# Patient Record
Sex: FEMALE | Race: WHITE
[De-identification: names, ages, dates, MRNs, and addresses within clinical notes are randomized per-mention and may not be internally consistent; named-entity substitution may affect disease eponyms.]

---

## 2017-03-23 ENCOUNTER — HOSPITAL ENCOUNTER (OUTPATIENT)
Dept: HOSPITAL 75 - LAB | Age: 52
End: 2017-03-23
Attending: OTOLARYNGOLOGY
Payer: COMMERCIAL

## 2017-03-23 DIAGNOSIS — E03.8: ICD-10-CM

## 2017-03-23 DIAGNOSIS — E04.2: Primary | ICD-10-CM

## 2017-03-23 LAB
CA-I BLD-SCNC: 1.16 MMOL/L (ref 1.16–1.32)
CORRECTED IONIZED CALCIUM: 1.16 MMOL/L (ref 1.16–1.32)

## 2017-03-23 PROCEDURE — 82330 ASSAY OF CALCIUM: CPT

## 2017-03-23 PROCEDURE — 36415 COLL VENOUS BLD VENIPUNCTURE: CPT

## 2017-03-24 LAB — CALCIUM PH: 7.39

## 2017-07-10 ENCOUNTER — HOSPITAL ENCOUNTER (OUTPATIENT)
Dept: HOSPITAL 75 - RAD | Age: 52
End: 2017-07-10
Attending: NURSE PRACTITIONER
Payer: COMMERCIAL

## 2017-07-10 PROCEDURE — 72100 X-RAY EXAM L-S SPINE 2/3 VWS: CPT

## 2017-07-10 PROCEDURE — 72072 X-RAY EXAM THORAC SPINE 3VWS: CPT

## 2017-07-10 NOTE — DIAGNOSTIC IMAGING REPORT
Three views of the thoracic spine.



INDICATION: Back pain.



FINDINGS:



The alignment of the thoracic spine is satisfactory. The

vertebral body heights are preserved. Disc heights are also

preserved. There is multilevel mild anterior osteophyte formation

seen. No posterior osteophytes are evident. The paravertebral

soft tissues appear unremarkable.



IMPRESSION: Minimal degenerative changes.



Dictated by: 



  Dictated on workstation # [id number]

## 2017-07-17 ENCOUNTER — HOSPITAL ENCOUNTER (OUTPATIENT)
Dept: HOSPITAL 75 - RAD | Age: 52
End: 2017-07-17
Attending: NURSE PRACTITIONER
Payer: COMMERCIAL

## 2017-07-17 DIAGNOSIS — M51.36: Primary | ICD-10-CM

## 2017-07-17 PROCEDURE — 72148 MRI LUMBAR SPINE W/O DYE: CPT

## 2017-07-17 NOTE — DIAGNOSTIC IMAGING REPORT
PROCEDURE: MRI lumbar spine.



TECHNIQUE: Multiplanar, multisequence MRI of the lumbar spine was

performed without contrast.



INDICATION: Back pain.



There are no previous MRI examinations available for comparison.



FINDINGS: The plain film examination of the lumbar spine

performed on 7/10/17 failed to show any sign of an acute

abnormality. However, there was degenerative disc and bony

disease at L5-S1 and to a lesser degree at L4-L5.



On the parasagittal images of this exam, there is again evidence

of narrowing of the disc spaces at L5-S1 and L4-L5. The discs at

these 2 levels are desiccated as well. At the L5-S1 level, there

is a disc bulge eccentric to the left. The disc indents the left

ventral aspect of the thecal sac and narrows the AP diameter to

approximately 8.2 mm. There does not appear to be any significant

narrowing of the neuroforamen at this level.



At L4-L5, there is also a disc bulge slightly eccentric to the

left. The disc narrows the AP diameter of the thecal sac to

approximately 8.9 mm. There does not appear to be any significant

neuroforaminal narrowing at this level.



The remainder of the lumbar spine is unremarkable for spinal

stenosis or nerve root encroachment.



There is no abnormal signal arising from the cord or the

vertebral bodies to indicate an acute abnormality.



There is no sign of a paraspinal mass.



IMPRESSION:

1. There is degenerative disc and bony disease at L4-L5 and

L5-S1. There is mild central stenosis on the left at both these

levels. There is no significant narrowing of the neuroforamen.

2. The remainder of the lumbar spine is unremarkable for spinal

stenosis or nerve root encroachment.

3. There is no sign of an acute bony abnormality or of a cord

lesion.



Dictated by: 



  Dictated on workstation # [id number]

## 2017-07-28 ENCOUNTER — HOSPITAL ENCOUNTER (OUTPATIENT)
Dept: HOSPITAL 75 - RAD | Age: 52
End: 2017-07-28
Attending: OBSTETRICS & GYNECOLOGY
Payer: COMMERCIAL

## 2017-07-28 DIAGNOSIS — Z12.31: Primary | ICD-10-CM

## 2017-07-28 PROCEDURE — 77067 SCR MAMMO BI INCL CAD: CPT

## 2017-07-31 NOTE — DIAGNOSTIC IMAGING REPORT
EXAMINATION:

Bilateral screening mammogram 2D views with tomosynthesis. The

current study was also evaluated with a Computer Aided Detection

(CAD) system.



INDICATION: 

Screening.



PERSONAL HISTORY: 

No current complaints stated on the questionnaire.



COMPARISON: 

07/27/2016.



FINDINGS:

The breasts are composed of heterogeneously dense parenchyma

which may decrease mammographic sensitivity. Allowing for

technique and positional differences, no suspicious change is

seen.



IMPRESSION: 

Dense breasts with no definite change. 



ACR BI-RADS Category 2: Benign findings.

Result letter will be mailed to the patient.

Note: At least 10% of breast cancer is not imaged by mammography.



 



Dictated by: 



  Dictated on workstation # [id number]

## 2018-03-09 NOTE — HISTORY AND PHYSICAL
DATE OF SERVICE:  03/23/2018



ADDENDUM:  The patient was seen for H&P on 03/05/2018.  



HISTORY OF PRESENT ILLNESS:

The patient is a 53-year-old white female referred by Dr Mahoney for 
screening colonoscopy. She is deemed to be of higher than average risk as her 
mother was diagnosed with colon cancer around the age of 60.  The patient 
reports around 01/01, she did have the onset of diarrhea with small amount of 
bright red blood.  She had no

chills or fever, had some mild crampy abdominal pain.  It was only on one 
occasion and has not recurred since.  She currently feels well, reporting 
stable weight.  Denying abdominal pain, bowel habit change other than the above 
which lasted for several days.  She states that she has been feeling well.  No 
chest pain, shortness of breath or dyspnea on exertion.



MEDICATIONS ON ADMISSION:

Include Crestor 10 mg daily, estradiol 1 mg daily, Bystolic 5 mg daily, Caltrate

with D one daily, and L-thyroxine 100 mcg daily.



PAST SURGICAL HISTORY:

She had thyroidectomy for what sounds like multinodular goiter in 03/2017 and

has been on thyroid replacement since.  She had total abdominal hysterectomy in

12/2016 and appendectomy in 2010.



FAMILY HISTORY:

Most notable for mother diagnosed with colon cancer at the age of 60.



SOCIAL HISTORY:

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

history.



PHYSICAL EXAMINATION:

GENERAL:  Reveals a well-appearing white female in no acute distress.

VITAL SIGNS:  Blood pressure 124/94, pulse 66 and regular.

HEENT:  She is a Mallampati class II oropharyngeal configuration.  No evidence

for pharyngeal erythema is noted.

CHEST:  Clear to auscultation.

CARDIOVASCULAR:  Reveals a regular rate and rhythm without murmur, S3 or S4.

ABDOMEN:  Soft, supple without mass, organomegaly or tenderness.

EXTREMITIES:  Reveal no cyanosis, clubbing or edema.



ASSESSMENT:

The patient is set up for screening colonoscopy on 03/23/2018.  Prep

instructions with the Suprep kit were given and she is deemed to be of higher

than average risk as there is a first degree family history of colon cancer. 

Index case being her mother diagnosed around the age of 60.



I thank you for the referral of this pleasant lady.





Job ID: [id number]

DocumentID: [id number]

Dictated Date:  03/09/2018 11:37:20

Transcription Date: 03/09/2018 12:28:16

Dictated By: AMRIT ARELLANO MD

MTDD

## 2018-03-16 ENCOUNTER — HOSPITAL ENCOUNTER (OUTPATIENT)
Dept: HOSPITAL 75 - PREOP | Age: 53
End: 2018-03-16
Attending: INTERNAL MEDICINE
Payer: COMMERCIAL

## 2018-03-16 VITALS — HEIGHT: 67 IN | WEIGHT: 205 LBS | BODY MASS INDEX: 32.18 KG/M2

## 2018-03-16 DIAGNOSIS — Z80.0: ICD-10-CM

## 2018-03-16 DIAGNOSIS — Z01.818: Primary | ICD-10-CM

## 2018-03-16 DIAGNOSIS — Z12.11: ICD-10-CM

## 2018-03-23 ENCOUNTER — HOSPITAL ENCOUNTER (OUTPATIENT)
Dept: HOSPITAL 75 - ENDO | Age: 53
Discharge: HOME | End: 2018-03-23
Attending: INTERNAL MEDICINE
Payer: COMMERCIAL

## 2018-03-23 VITALS — DIASTOLIC BLOOD PRESSURE: 86 MMHG | SYSTOLIC BLOOD PRESSURE: 140 MMHG

## 2018-03-23 VITALS — WEIGHT: 205 LBS | BODY MASS INDEX: 32.18 KG/M2 | HEIGHT: 67 IN

## 2018-03-23 VITALS — DIASTOLIC BLOOD PRESSURE: 52 MMHG | SYSTOLIC BLOOD PRESSURE: 159 MMHG

## 2018-03-23 VITALS — DIASTOLIC BLOOD PRESSURE: 76 MMHG | SYSTOLIC BLOOD PRESSURE: 132 MMHG

## 2018-03-23 DIAGNOSIS — K62.1: ICD-10-CM

## 2018-03-23 DIAGNOSIS — Z12.11: Primary | ICD-10-CM

## 2018-03-23 DIAGNOSIS — Z79.899: ICD-10-CM

## 2018-03-23 DIAGNOSIS — Z80.0: ICD-10-CM

## 2018-03-23 RX ADMIN — MIDAZOLAM HYDROCHLORIDE PRN MG: 1 INJECTION, SOLUTION INTRAMUSCULAR; INTRAVENOUS at 08:06

## 2018-03-23 RX ADMIN — MIDAZOLAM HYDROCHLORIDE PRN MG: 1 INJECTION, SOLUTION INTRAMUSCULAR; INTRAVENOUS at 08:41

## 2018-03-23 RX ADMIN — FENTANYL CITRATE PRN MCG: 50 INJECTION, SOLUTION INTRAMUSCULAR; INTRAVENOUS at 08:05

## 2018-03-23 RX ADMIN — FENTANYL CITRATE PRN MCG: 50 INJECTION, SOLUTION INTRAMUSCULAR; INTRAVENOUS at 08:08

## 2018-03-23 RX ADMIN — FENTANYL CITRATE PRN MCG: 50 INJECTION, SOLUTION INTRAMUSCULAR; INTRAVENOUS at 08:37

## 2018-03-23 RX ADMIN — FENTANYL CITRATE PRN MCG: 50 INJECTION, SOLUTION INTRAMUSCULAR; INTRAVENOUS at 08:40

## 2018-03-23 RX ADMIN — MIDAZOLAM HYDROCHLORIDE PRN MG: 1 INJECTION, SOLUTION INTRAMUSCULAR; INTRAVENOUS at 08:11

## 2018-03-23 NOTE — OPERATIVE REPORT
DATE OF SERVICE:  03/23/2018



REFERRING PHYSICIAN:

Yosi Mahoney MD



INDICATION FOR THE PROCEDURE:

Screening colonoscopy, family history of colon cancer.



DESCRIPTION OF THE PROCEDURE:

The patient was placed in left lateral decubitus position.  Prior to undergoing

colonoscopy digital rectal evaluation was performed.  Anal sphincter tone was

normal and the perianal reflexes intact.  No abnormalities were noted on visual

inspection of anal canal or distal rectal vault.  The colonoscope was then

inserted into the rectum and under direct visualization advanced to the cecum. 

The cecum was identified by identification of the ileocecal valve and cecal

strap.  Photographic documentation was obtained.  Careful inspection was made as

the colonoscope withdrawn.  The patient tolerated the procedure well.



FINDINGS:

There was no evidence for internal or external hemorrhoids.  Present distal

rectum with a diminutive sessile 2 mm hyperplastic appearing polyp.  It was

biopsied, ablated and submitted for histopathology with no noted blood loss. 

The remainder of the rectum was unremarkable.  There is sigmoid and colonic

redundancy, but no evidence for diverticular disease or polyps.  The descending

colon, splenic flexure, transverse colon, hepatic flexure, ascending colon and

cecum were unremarkable.



ASSESSMENT:

Diminutive hyperplastic appearing polyp was present in the distal rectum.  It

was subsequently biopsied and ablated with hot forceps with no subsequent blood

loss.  No other significant abnormalities were noted on today's procedure. 

Considering family history we would advocate consideration for repeat screening

colonoscopy in 5 years.  I thank you for the referral of this pleasant lady.





Job ID: [id number]

DocumentID: [id number]

Dictated Date:  03/23/2018 10:21:55

Transcription Date: 03/23/2018 14:32:59

Dictated By: AMRIT ARELLANO MD

## 2018-03-23 NOTE — XMS REPORT
Clinical Summary

 Created on: 2018



Morales Yoonelle

External Reference #: [id number]

: 1965

Sex: Female



Demographics







 Address  [address] [facility] [place]  [address]

 

 Home Phone  [phone]

 

 Preferred Language  English

 

 Marital Status  Unknown

 

 Hindu Affiliation  CAT

 

 Race  Unknown

 

 Ethnic Group  Not  or 





Author







 Author  [facility]

 

 Organization  [facility]

 

 Address  Unknown

 

 Phone  Unavailable







Support







 Name  Relationship  Address  Phone

 

 JESUS YOON  Unknown  [phone]







Care Team Providers







 Care Team Member Name  Role  Phone

 

 Aneudy Padilla MD  PCP  [phone]

 

 Rosamaria Haywood MD  Unavailable  [phone]







Source Comments

Some departments are not documenting in the electronic medical record.  If you 
do not see the information that you expected, contact Release of Information in 
the Health Information Management department at [phone] for further 
assistance in locating additional records.[facility]



Allergies

Not on File



Current Medications







      



  Prescription   Sig.   Disp.   Refills   Start   End Date   Status



      Date  

 

      



  rosuvastatin (CRESTOR) 10   Take 10 mg by mouth       Active



  mg tablet   daily.     

 

      



  nebivolol (BYSTOLIC) 5 mg   Take 5 mg by mouth daily.       Active



  tablet      

 

      



  aspirin EC 81 mg tablet   Take 81 mg by mouth       Active



   daily.     

 

      



  cyanocobalamin (VITAMIN   Take 1,000 mcg by mouth       Active



  B-12) 1,000 mcg tablet   daily.     

 

      



  CALCIUM CARBONATE   Take  by mouth.       Active



  (CALTRATE 600 PO)      

 

      



  MULTIVIT WITH   Take 1 Tab by mouth       Active



  CALCIUM,IRON,MIN   daily.     



  (ONE-A-DAY WOMENS FORMULA      



  PO)      

 

      



  zolpidem CR (AMBIEN CR)   Take 12.5 mg by mouth at       Active



  12.5 mg tablet   bedtime as needed.     

 

      



  methimazole (TAPAZOLE) 5   Take 1 Tab by mouth   30 Tab   11   20    
Active



  mg tablet   daily.     12  







Active Problems







 



  Problem   Noted Date

 

 



  Thyroid dysfunction   05/15/2012

 

 



  Hyperthyroidism   05/15/2012

 

 



  Goiter   05/15/2012

 

 



  HTN (hypertension)   05/15/2012







Family History







   



  Medical History   Relation   Name   Comments

 

   



  Hypertension   Mother  

 

   



  Thyroid Disease   Mother  









   



  Relation   Name   Status   Comments

 

   



  Father     



    (Age 50) 

 

   



  Mother    Alive 







Social History







    



  Tobacco Use   Types   Packs/Day   Years Used   Date

 

    



  Never Smoker    

 

    



  Smokeless Tobacco: Never   



  Used   









 Tobacco Cessation: Counseling Given: No











 



  Sex Assigned at Birth   Date Recorded

 

 



  Not on file 







Last Filed Vital Signs







  



  Vital Sign   Reading   Time Taken

 

  



  Blood Pressure   108/61   05/15/2012 12:52 PM CDT

 

  



  Pulse   75   05/15/2012 12:52 PM CDT

 

  



  Temperature   -   -

 

  



  Respiratory Rate   -   -

 

  



  Oxygen Saturation   -   -

 

  



  Inhaled Oxygen   -   -



  Concentration  

 

  



  Weight   67.9 kg (149 lb 9.6 oz)   05/15/2012 12:52 PM CDT

 

  



  Height   -   -

 

  



  Body Mass Index   -   -







Plan of Treatment







   



  Health Maintenance   Due Date   Last Done   Comments

 

   



  HEPATITIS C SCREENING   1965  

 

   



  PHYSICAL (COMPREHENSIVE)   1972  



  EXAM   

 

   



  PERTUSSIS VACCINE   1976  

 

   



  HIV SCREENING   1980  

 

   



  TETANUS VACCINE   1982  

 

   



  CERVICAL CANCER SCREENING   1995  

 

   



  BREAST CANCER SCREENING   2005  

 

   



  COLORECTAL CANCER   2015  



  SCREENING   

 

   



  INFLUENZA VACCINE   10/01/2018  







Results

Not on filefrom Last 3 Months

## 2018-03-23 NOTE — PRE-OP NOTE & CONSCIOUS SEDAT
Pre-Operative Progress Note


H&P Reviewed


The H&P was reviewed, patient examined and no changes noted.


Date H&P Reviewed:  Mar 23, 2018


Time H&P Reviewed:  07:45





Conscious Sedation Pre-Proced


ASA Class:  2











Airway Mallampati Classification: ([place] appropriate class) I.  II.  III,  IV


 


Lungs 


 


Heart 


 


 ASA score


 


 ASA 1: a normal healthy patient


 


 ASA 2:  a patient with a mild systemic disease (mid diabetes, controlled 

hypertension, obesity 


 


 ASA 3:  a patient with a severe systemic disease that limits activity  (angina

, COPD, prior Myocardial infarction)


 


 ASA 4:  a patient with an incapacitating disease that is a constant threat to 

life (CHF, renal failure)


 


 ASA 5:  a moribund patient not expected to survive 24 hrs.  (ruptured aneurysm)


 


 ASA 6:  a declared brain dead patient whose organs are being harvested.


 


 For emergent operations, add the letter E after the classification








Grade 2


Sedation Plan:  Analgesia, Amnesia, Plan communicated to team members, 

Discussed options with patient/fam, Discussed risks with patient/fam


Note


The patient is an appropriate candidate to undergo the planned procedure, 

sedation, and anesthesia.





The patient immediately re-assessed prior to indication.











AMRIT ARELLANO MD Mar 23, 2018 10:08

## 2018-08-01 ENCOUNTER — HOSPITAL ENCOUNTER (OUTPATIENT)
Dept: HOSPITAL 75 - RAD | Age: 53
End: 2018-08-01
Attending: OBSTETRICS & GYNECOLOGY
Payer: COMMERCIAL

## 2018-08-01 DIAGNOSIS — Z12.31: Primary | ICD-10-CM

## 2018-08-01 PROCEDURE — 77067 SCR MAMMO BI INCL CAD: CPT

## 2018-08-01 NOTE — DIAGNOSTIC IMAGING REPORT
INDICATION: Routine screening.



Comparison is made with prior exam from 07/28/2017 and

07/27/2016.



2-D and 3-D bilateral screening mammography was performed with a

Computer Aided Detection (CAD) system.



FINDINGS: Both breasts remain heterogeneously dense, limiting the

sensitivity of mammography. The parenchymal pattern is stable.

There are benign calcifications bilaterally. No dominant mass or

malignant appearing microcalcifications are seen. The axillae are

unremarkable.



IMPRESSION: No mammographic features suspicious for malignancy

are identified.



ACR BI-RADS Category 2: Benign findings.

Result letter will be mailed to the patient.

Note: At least 10% of breast cancer is not imaged by mammography.



Dictated by: 



  Dictated on workstation # [id number]

## 2018-10-29 ENCOUNTER — HOSPITAL ENCOUNTER (OUTPATIENT)
Dept: HOSPITAL 75 - RAD | Age: 53
End: 2018-10-29
Attending: NURSE PRACTITIONER
Payer: COMMERCIAL

## 2018-10-29 DIAGNOSIS — M79.605: Primary | ICD-10-CM

## 2018-10-29 DIAGNOSIS — R20.2: ICD-10-CM

## 2018-10-29 NOTE — DIAGNOSTIC IMAGING REPORT
PROCEDURE: 

US left lower extremity venous.



TECHNIQUE: 

Multiple Real-time grayscale images were obtained over the left

lower extremity in various projections. Additional duplex Doppler

and color Doppler images were also obtained.



INDICATION: 

Left leg pain.



FINDINGS:

The veins in the left leg have good color filling and

compressibility. There is normal spontaneous and augmented flow.



IMPRESSION: 

Negative venous Doppler left leg.



Dictated by: 



  Dictated on workstation # [id number]

## 2018-12-13 ENCOUNTER — HOSPITAL ENCOUNTER (OUTPATIENT)
Dept: HOSPITAL 75 - CARD | Age: 53
End: 2018-12-13
Attending: INTERNAL MEDICINE
Payer: COMMERCIAL

## 2018-12-13 DIAGNOSIS — I08.1: ICD-10-CM

## 2018-12-13 DIAGNOSIS — R00.2: ICD-10-CM

## 2018-12-13 DIAGNOSIS — I82.409: Primary | ICD-10-CM

## 2018-12-13 DIAGNOSIS — E78.5: ICD-10-CM

## 2018-12-13 DIAGNOSIS — I10: ICD-10-CM

## 2018-12-13 PROCEDURE — 93306 TTE W/DOPPLER COMPLETE: CPT

## 2019-01-30 ENCOUNTER — HOSPITAL ENCOUNTER (OUTPATIENT)
Dept: HOSPITAL 75 - CARD | Age: 54
End: 2019-01-30
Attending: PHYSICIAN ASSISTANT
Payer: COMMERCIAL

## 2019-01-30 VITALS — SYSTOLIC BLOOD PRESSURE: 174 MMHG | DIASTOLIC BLOOD PRESSURE: 78 MMHG

## 2019-01-30 DIAGNOSIS — E78.5: ICD-10-CM

## 2019-01-30 DIAGNOSIS — R00.2: Primary | ICD-10-CM

## 2019-01-30 DIAGNOSIS — I10: ICD-10-CM

## 2019-01-30 PROCEDURE — 93017 CV STRESS TEST TRACING ONLY: CPT

## 2019-01-30 PROCEDURE — 78452 HT MUSCLE IMAGE SPECT MULT: CPT

## 2019-01-30 NOTE — STRESS TEST
DATE OF SERVICE:  01/30/2019



EXERCISE MYOVIEW STRESS TEST REPORT



REFERRING PHYSICIAN:

Dr. Mahoney.



Baseline heart rate is 60.  Baseline blood pressure 141/92.  Baseline EKG, sinus

rhythm with no ischemic changes.



In summary, the patient was injected with 10.86 mCi of technetium-99 Myoview and

the resting images were obtained.  Then, the patient started exercising with a

baseline heart rate, blood pressure and EKG mentioned above.  The patient was

able to exercise for 6 minutes 30 seconds on standard Mihai protocol.  With peak

exercise level, EKG was showing 1 mm upsloping ST depression in II, III, aVF,

V4, V5 and V6.  During recovery, the ST depression became more horizontal with

T-wave inversion.



The resting and stress images were reviewed and compared in the short axis,

horizontal long axis, and vertical long axis views.  Review of the images showed

breast attenuation with reversible ischemia involving the mid to apical anterior

wall and anterolateral wall.  SSS is 8, SDS 8, TID value 0.92.  On the gated

images, the left ventricle appeared to be normal in size with normal

contractility.  Calculated ejection fraction 57%.



CONCLUSION:

1.  Fair exercise tolerance, a total of 6 minutes 30 seconds on standard Mihai

protocol, total of 7.9 METS achieving 91% of maximum expected heart rate.

2.  Hypertensive response to exercise, returned to baseline during recovery.

3.  Reversible ischemia involving the mid to apical anterior wall and

anterolateral wall.

4.  Nondiagnostic EKG changes with exercise returned to baseline during

recovery.

5.  Normal left ventricular size with normal contractility.  Calculated ejection

fraction 57%.





Job ID: [id number]

DocumentID: [id number]

Dictated Date:  01/30/2019 14:28:00

Transcription Date: 01/30/2019 14:52:42

Dictated By: DARIANA LENTZ MD

## 2019-02-13 ENCOUNTER — HOSPITAL ENCOUNTER (OUTPATIENT)
Dept: HOSPITAL 75 - CATH | Age: 54
Discharge: HOME | End: 2019-02-13
Attending: INTERNAL MEDICINE
Payer: COMMERCIAL

## 2019-02-13 VITALS — DIASTOLIC BLOOD PRESSURE: 90 MMHG | SYSTOLIC BLOOD PRESSURE: 151 MMHG

## 2019-02-13 VITALS — SYSTOLIC BLOOD PRESSURE: 141 MMHG | DIASTOLIC BLOOD PRESSURE: 79 MMHG

## 2019-02-13 VITALS — HEIGHT: 67 IN | WEIGHT: 210 LBS | BODY MASS INDEX: 32.96 KG/M2

## 2019-02-13 VITALS — DIASTOLIC BLOOD PRESSURE: 73 MMHG | SYSTOLIC BLOOD PRESSURE: 134 MMHG

## 2019-02-13 VITALS — DIASTOLIC BLOOD PRESSURE: 74 MMHG | SYSTOLIC BLOOD PRESSURE: 141 MMHG

## 2019-02-13 VITALS — DIASTOLIC BLOOD PRESSURE: 74 MMHG | SYSTOLIC BLOOD PRESSURE: 138 MMHG

## 2019-02-13 DIAGNOSIS — G47.00: ICD-10-CM

## 2019-02-13 DIAGNOSIS — Z82.49: ICD-10-CM

## 2019-02-13 DIAGNOSIS — I25.10: ICD-10-CM

## 2019-02-13 DIAGNOSIS — I10: ICD-10-CM

## 2019-02-13 DIAGNOSIS — E78.5: ICD-10-CM

## 2019-02-13 DIAGNOSIS — Z86.718: ICD-10-CM

## 2019-02-13 DIAGNOSIS — Z79.899: ICD-10-CM

## 2019-02-13 DIAGNOSIS — F41.9: ICD-10-CM

## 2019-02-13 DIAGNOSIS — E89.0: ICD-10-CM

## 2019-02-13 DIAGNOSIS — R07.9: Primary | ICD-10-CM

## 2019-02-13 DIAGNOSIS — R00.2: ICD-10-CM

## 2019-02-13 LAB
ALBUMIN SERPL-MCNC: 4.2 GM/DL (ref 3.2–4.5)
ALP SERPL-CCNC: 110 U/L (ref 40–136)
ALT SERPL-CCNC: 14 U/L (ref 0–55)
APTT BLD: 25 SEC (ref 24–35)
BILIRUB SERPL-MCNC: 0.5 MG/DL (ref 0.1–1)
BUN/CREAT SERPL: 21
CALCIUM SERPL-MCNC: 8.8 MG/DL (ref 8.5–10.1)
CHLORIDE SERPL-SCNC: 105 MMOL/L (ref 98–107)
CO2 SERPL-SCNC: 24 MMOL/L (ref 21–32)
CREAT SERPL-MCNC: 0.7 MG/DL (ref 0.6–1.3)
ERYTHROCYTE [DISTWIDTH] IN BLOOD BY AUTOMATED COUNT: 13.7 % (ref 10–14.5)
GFR SERPLBLD BASED ON 1.73 SQ M-ARVRAT: > 60 ML/MIN
GLUCOSE SERPL-MCNC: 87 MG/DL (ref 70–105)
HCT VFR BLD CALC: 40 % (ref 35–52)
HGB BLD-MCNC: 12.9 G/DL (ref 11.5–16)
INR PPP: 1 (ref 0.8–1.4)
MCH RBC QN AUTO: 30 PG (ref 25–34)
MCHC RBC AUTO-ENTMCNC: 33 G/DL (ref 32–36)
MCV RBC AUTO: 91 FL (ref 80–99)
PLATELET # BLD: 251 10^3/UL (ref 130–400)
PMV BLD AUTO: 10.5 FL (ref 7.4–10.4)
POTASSIUM SERPL-SCNC: 4 MMOL/L (ref 3.6–5)
PROT SERPL-MCNC: 7.1 GM/DL (ref 6.4–8.2)
PROTHROMBIN TIME: 12.7 SEC (ref 12.2–14.7)
SODIUM SERPL-SCNC: 140 MMOL/L (ref 135–145)
WBC # BLD AUTO: 7.3 10^3/UL (ref 4.3–11)

## 2019-02-13 PROCEDURE — 71045 X-RAY EXAM CHEST 1 VIEW: CPT

## 2019-02-13 PROCEDURE — 80053 COMPREHEN METABOLIC PANEL: CPT

## 2019-02-13 PROCEDURE — 85730 THROMBOPLASTIN TIME PARTIAL: CPT

## 2019-02-13 PROCEDURE — 85610 PROTHROMBIN TIME: CPT

## 2019-02-13 PROCEDURE — 93458 L HRT ARTERY/VENTRICLE ANGIO: CPT

## 2019-02-13 PROCEDURE — 36415 COLL VENOUS BLD VENIPUNCTURE: CPT

## 2019-02-13 PROCEDURE — 85027 COMPLETE CBC AUTOMATED: CPT

## 2019-02-13 PROCEDURE — 87081 CULTURE SCREEN ONLY: CPT

## 2019-02-13 NOTE — XMS REPORT
Continuity of Care Document

 Created on: 2019



VANI SARMIENTO

External Reference #: [id number]

: 1965

Sex: Female



Demographics







 Address  [facility] [place]  [address]

 

 Home Phone  [phone] x

 

 Preferred Language  Unknown

 

 Marital Status  Unknown

 

 Mu-ism Affiliation  Unknown

 

 Race  Unknown

 

 Ethnic Group  Unknown





Author







 Author  [facility]

 

 Organization  [facility]

 

 Address  Unknown

 

 Phone  Unavailable



              



Allergies

      





 Active            Description            Code            Type            
Severity            Reaction            Onset            Reported/Identified   
         Relationship to Patient            Clinical Status        

 

 Yes            No Known Drug Allergies            [id number]            Drug 
Allergy            Unknown            N/A                         2018   
                               



                  



Medications

      



There is no data.                  



Problems

      





 Date Dx Coded            Attending            Type            Code            
Diagnosis            Diagnosed By        

 

 2010                         Ot            540.9            ACUTE 
APPENDICITIS NOS                     

 

 2010                         Ot            789.59            OTHER 
ASCITES                     

 

 2014            CHAYA CARRILLO APRMICHELL            Ot            719.41    
                              

 

 2014            CHAYA CARRILLO APRMICHELL            Ot            719.41    
                              

 

 2015            MATHEW VALDIVIA MD            Ot            
V76.12                                  

 

 2015            CHAYA CARRILLO            Ot            453.41    
                              

 

 2015            MATHEW VALDIVIA MD            Ot            
V76.12                                  

 

 2016            MATHEW VALDIVIA MD            Ot            
Z12.31            ENCNTR SCREEN MAMMOGRAM FOR MALIGNANT NE                     

 

 2016            MATHEW VALDIVIA MD            Ot            
Z12.31            ENCNTR SCREEN MAMMOGRAM FOR MALIGNANT NE                     

 

 2016            DARIANA LENTZ MD            Ot            E78.2       
     MIXED HYPERLIPIDEMIA                     

 

 2016            DARIANA LENTZ MD            Ot            I10         
   ESSENTIAL (PRIMARY) HYPERTENSION                     

 

 2016            DARIANA LENTZ MD            Ot            I82.409     
       ACUTE EMBOLISM AND THOMBOS UNSP DEEP VN                      

 

 2016            DARIANA LENTZ MD            Ot            R00.2       
     PALPITATIONS                     

 

 2016            DARIANA LENTZ MD            Ot            Z82.49      
      FAMILY HX OF ISCHEM HEART DIS AND OTH DI                     

 

 2016            DARIANA LENTZ MD, Ot            E78.2       
     MIXED HYPERLIPIDEMIA                     

 

 2016            DARIANA LENTZ MD            Ot            I10         
   ESSENTIAL (PRIMARY) HYPERTENSION                     

 

 2016            DARIANA LENTZ MD, Ot            I82.409     
       ACUTE EMBOLISM AND THOMBOS UNSP DEEP VN                      

 

 2016            DARIANA LENTZ MD            Ot            R00.2       
     PALPITATIONS                     

 

 2016            DARIANA LENTZ MD            Ot            Z82.49      
      FAMILY HX OF ISCHEM HEART DIS AND OTH DI                     

 

 2016            MATHEW VALDIVIA MD, Ot            
Z12.31            ENCNTR SCREEN MAMMOGRAM FOR MALIGNANT NE                     

 

 2016                         Ot            V76.12            OTH SCREEN 
MAMMO-MALIGN NEOPLASM OF SURINDER                     

 

 2016                         Ot            V76.12            OTH SCREEN 
MAMMO-MALIGN NEOPLASM OF SURINDER                     

 

 2016                         Ot            368.2            DIPLOPIA    
                 

 

 2016            EDDIE ANAI LEANDRO ARNCARLEE            Ot            300.12   
         DISSOCIATIVE AMNESIA                     

 

 2016            MATHEW VALDIVIA MD            Ot            
793.82            INCONCLUSIVE MAMMOGRAM                     

 

 2016            MATHEW VALDIVIA MD, Ot            
V76.12            OTH SCREEN MAMMO-MALIGN NEOPLASM OF SURINDER                     

 

 2016            MATHEW VALDIVIA MD            Ot            
793.80            UNSPEC ABNORMAL MAMMOGRAM                     

 

 2016            MATHEW VALDIVIA MD            Ot            
793.80            UNSPEC ABNORMAL MAMMOGRAM                     

 

 2016            MATHEW VALDIVIA MD, Ot            
V76.12            OTH SCREEN MAMMO-MALIGN NEOPLASM OF SURINDER                     

 

 2016            CHAYA CARRILLO APRN            Ot            719.41    
        JOINT PAIN-SHLDER                     

 

 2016            CHAYA CARRILLO APRN            Ot            719.41    
        JOINT PAIN-SHLDER                     

 

 2016            MATHEW VALDIVIA MD, Ot            
V76.12            OTH SCREEN MAMMO-MALIGN NEOPLASM OF SURINDER                     

 

 2016            CHAYA CARRILLO APRN            Ot            453.41    
        ACUTE VENOUS EMBOLISM   THROMBOSIS DEEP                      

 

 2016            MATHEW VALDIVIA MD, Ot            
Z12.31            ENCNTR SCREEN MAMMOGRAM FOR MALIGNANT NE                     

 

 2016            DARIANA LENTZ MD            Ot            E78.2       
     MIXED HYPERLIPIDEMIA                     

 

 2016            DARIANA LENTZ MD            Ot            I10         
   ESSENTIAL (PRIMARY) HYPERTENSION                     

 

 2016            DARIANA LENTZ MD            Ot            I82.409     
       ACUTE EMBOLISM AND THOMBOS UNSP DEEP VN                      

 

 2016            DARIANA LENTZ MD            Ot            R00.2       
     PALPITATIONS                     

 

 2016            DARIANA LENTZ MD, Ot            Z82.49      
      FAMILY HX OF ISCHEM HEART DIS AND OTH DI                     

 

 2016                         Ot            V76.12            OTH SCREEN 
MAMMO-MALIGN NEOPLASM OF SURINDER                     

 

 2016                         Ot            V76.12            OTH SCREEN 
MAMMO-MALIGN NEOPLASM OF SURINDER                     

 

 2016                         Ot            368.2            DIPLOPIA    
                 

 

 2016            ANAI MORGAN ARN            Ot            300.12   
         DISSOCIATIVE AMNESIA                     

 

 2016            MATHEW VALDIVIA MD            Ot            
793.82            INCONCLUSIVE MAMMOGRAM                     

 

 2016            MATHEW VALDIVIA MD, Ot            
V76.12            OTH SCREEN MAMMO-MALIGN NEOPLASM OF SURINDER                     

 

 2016            MATHEW VALDIVIA MD            Ot            
793.80            UNSPEC ABNORMAL MAMMOGRAM                     

 

 2016            MATHEW VALDIVIA MD            Ot            
793.80            UNSPEC ABNORMAL MAMMOGRAM                     

 

 2016            MATHEW VALDIVIA MD, Ot            
V76.12            OTH SCREEN MAMMO-MALIGN NEOPLASM OF SURINDER                     

 

 2016            CHAYA CARRILLO APRN            Ot            719.41    
        JOINT PAIN-SHLDER                     

 

 2016            CHAYA CARRILLO N APRN            Ot            719.41    
        JOINT PAIN-SHLDER                     

 

 2016            MATHEW VALDIVIA MD, Ot            
V76.12            OTH SCREEN MAMMO-MALIGN NEOPLASM OF SURINDER                     

 

 2016            CHAYA CARRILLO APRN            Ot            453.41    
        ACUTE VENOUS EMBOLISM   THROMBOSIS DEEP                      

 

 2016            MATHEW VALDIVIA MD            Ot            
Z12.31            ENCNTR SCREEN MAMMOGRAM FOR MALIGNANT NE                     

 

 2016            DARIANA LENTZ MD            Ot            E78.2       
     MIXED HYPERLIPIDEMIA                     

 

 2016            DARIANA LENTZ MD            Ot            I10         
   ESSENTIAL (PRIMARY) HYPERTENSION                     

 

 2016            DARIANA LENTZ MD            Ot            I82.409     
       ACUTE EMBOLISM AND THOMBOS UNSP DEEP VN                      

 

 2016            DARIANA LENTZ MD            Ot            R00.2       
     PALPITATIONS                     

 

 2016            DARIANA LENTZ MD            Ot            Z82.49      
      FAMILY HX OF ISCHEM HEART DIS AND OTH DI                     

 

 10/20/2016            DARIANA LENTZ MD            Ot            E78.2       
     MIXED HYPERLIPIDEMIA                     

 

 10/20/2016            DARIANA LENTZ MD            Ot            I10         
   ESSENTIAL (PRIMARY) HYPERTENSION                     

 

 10/20/2016            DARIANA LENTZ MD            Ot            I82.409     
       ACUTE EMBOLISM AND THOMBOS UNSP DEEP VN                      

 

 10/20/2016            DARIANA LENTZ MD            Ot            R00.2       
     PALPITATIONS                     

 

 10/20/2016            DARIANA LENTZ MD            Ot            Z82.49      
      FAMILY HX OF ISCHEM HEART DIS AND OTH DI                     

 

 10/20/2016                         Ot            V76.12            OTH SCREEN 
MAMMO-MALIGN NEOPLASM OF SURINDER                     

 

 10/20/2016                         Ot            V76.12            OTH SCREEN 
MAMMO-MALIGN NEOPLASM OF SURINDER                     

 

 10/20/2016                         Ot            368.2            DIPLOPIA    
                 

 

 10/20/2016            OSANAI MONTALVO ARNP            Ot            300.12   
         DISSOCIATIVE AMNESIA                     

 

 10/20/2016            MATHEW VALDIVIA MD            Ot            
793.82            INCONCLUSIVE MAMMOGRAM                     

 

 10/20/2016            MATHEW VALDIVIA MD, Ot            
V76.12            OTH SCREEN MAMMO-MALIGN NEOPLASM OF SURINDER                     

 

 10/20/2016            MATHEW VALDIVIA MD            Ot            
793.80            UNSPEC ABNORMAL MAMMOGRAM                     

 

 10/20/2016            MATHEW VALDIVIA MD            Ot            
793.80            UNSPEC ABNORMAL MAMMOGRAM                     

 

 10/20/2016            MATHEW VALDIVIA MD, Ot            
V76.12            OTH SCREEN MAMMO-MALIGN NEOPLASM OF SURINDER                     

 

 10/20/2016            CHAYA CARRILLO APRN            Ot            719.41    
        JOINT PAIN-SHLDER                     

 

 10/20/2016            CHAYA CARRILLO N APRN            Ot            719.41    
        JOINT PAIN-SHLDER                     

 

 10/20/2016            MATHEW VALDIVIA MD            Ot            
V76.12            OTH SCREEN MAMMO-MALIGN NEOPLASM OF SURINDER                     

 

 10/20/2016            CHAYA CARRILLO APRN            Ot            453.41    
        ACUTE VENOUS EMBOLISM   THROMBOSIS DEEP                      

 

 10/20/2016            MATHWE VALDIVIA MD            Ot            
Z12.31            ENCNTR SCREEN MAMMOGRAM FOR MALIGNANT NE                     

 

 10/20/2016            DARIANA LENTZ MD            Ot            E78.2       
     MIXED HYPERLIPIDEMIA                     

 

 10/20/2016            DARIANA LENTZ MD            Ot            I10         
   ESSENTIAL (PRIMARY) HYPERTENSION                     

 

 10/20/2016            DARIANA LENTZ MD            Ot            I82.409     
       ACUTE EMBOLISM AND THOMBOS UNSP DEEP VN                      

 

 10/20/2016            DARIANA LENTZ MD            Ot            R00.2       
     PALPITATIONS                     

 

 10/20/2016            DARIANA LENTZ MD, Ot            Z82.49      
      FAMILY HX OF ISCHEM HEART DIS AND OTH DI                     

 

 10/27/2016            DARIANA LENTZ MD            Ot            E78.2       
     MIXED HYPERLIPIDEMIA                     

 

 10/27/2016            DARIANA LENTZ MD, Ot            I10         
   ESSENTIAL (PRIMARY) HYPERTENSION                     

 

 10/27/2016            DARIANA LENTZ MD            Ot            R00.2       
     PALPITATIONS                     

 

 10/27/2016            DARIANA LENTZ MD, Ot            Z82.49      
      FAMILY HX OF ISCHEM HEART DIS AND OTH DI                     

 

 2016            MATHEW VALDIVIA MD, Ot            D64.9
            ANEMIA, UNSPECIFIED                     

 

 2016            MATHEW VALDIVIA MD, Ot            N92.0
            EXCESSIVE AND FREQUENT MENSTRUATION WITH                     

 

 2016            MATHEW VALDIVIA MD, Ot            N93.8
            OTHER SPECIFIED ABNORMAL UTERINE AND VAG                     

 

 2016            MATHEW VALDIVIA MD, Ot            
R19.09            OTHER INTRA-ABDOMINAL AND PELVIC SWELLIN                     

 

 2016            MATHEW VALDIVIA MD, Ot            
Z01.812            ENCOUNTER FOR PREPROCEDURAL LABORATORY E                     

 

 2016            MATHEW VALDIVIA MD, Ot            Z11.2
            ENCOUNTER FOR SCREENING FOR OTHER BACTER                     

 

 2016                         Ot            V76.12            OTH SCREEN 
MAMMO-MALIGN NEOPLASM OF SURINDER                     

 

 2016                         Ot            V76.12            OTH SCREEN 
MAMMO-MALIGN NEOPLASM OF SURINDER                     

 

 2016                         Ot            368.2            DIPLOPIA    
                 

 

 2016            ANAI MORGAN            Ot            300.12   
         DISSOCIATIVE AMNESIA                     

 

 2016            MATHEW VALDIVIA MD            Ot            
793.82            INCONCLUSIVE MAMMOGRAM                     

 

 2016            MATHEW VALDIVIA MD, Ot            
V76.12            OTH SCREEN MAMMO-MALIGN NEOPLASM OF SURINDER                     

 

 2016            MATHEW VALDIVIA MD            Ot            
793.80            UNSPEC ABNORMAL MAMMOGRAM                     

 

 2016            MATHEW VALDIVIA MD            Ot            
793.80            UNSPEC ABNORMAL MAMMOGRAM                     

 

 2016            MATHEW VALDIVIA MD, Ot            
V76.12            OTH SCREEN MAMMO-MALIGN NEOPLASM OF SURINDER                     

 

 2016            CHAYA CARRILLO N APRN            Ot            719.41    
        JOINT PAIN-SHLDER                     

 

 2016            CHAYA CARRILLO N APRN            Ot            719.41    
        JOINT PAIN-SHLDER                     

 

 2016            MATHEW VALDIVIA MD, Ot            
V76.12            OTH SCREEN MAMMO-MALIGN NEOPLASM OF SURINDER                     

 

 2016            CHAYA CARRILLO N APRN            Ot            453.41    
        ACUTE VENOUS EMBOLISM   THROMBOSIS DEEP                      

 

 2016            MATHEW VALDIVIA MD, Ot            
Z12.31            ENCNTR SCREEN MAMMOGRAM FOR MALIGNANT NE                     

 

 2016            DARIANA LENTZ MD            Ot            E78.2       
     MIXED HYPERLIPIDEMIA                     

 

 2016            DARIANA LENTZ MD            Ot            I10         
   ESSENTIAL (PRIMARY) HYPERTENSION                     

 

 2016            DARIANA LENTZ MD            Ot            R00.2       
     PALPITATIONS                     

 

 2016            DARIANA LENTZ MD            Ot            Z82.49      
      FAMILY HX OF ISCHEM HEART DIS AND OTH DI                     

 

 2016            MATHEW VALDIVIA MD, Ot            
Z12.31            ENCNTR SCREEN MAMMOGRAM FOR MALIGNANT NE                     

 

 2016            DARIANA LENTZ MD            Ot            E78.2       
     MIXED HYPERLIPIDEMIA                     

 

 2016            DARIANA LENTZ MD            Ot            I10         
   ESSENTIAL (PRIMARY) HYPERTENSION                     

 

 2016            DARIANA LENTZ MD            Ot            R00.2       
     PALPITATIONS                     

 

 2016            DARIANA LENTZ MD            Ot            Z82.49      
      FAMILY HX OF ISCHEM HEART DIS AND OTH DI                     

 

 2016            MATHEW VALDIVIA MD, Ot            
Z12.31            ENCNTR SCREEN MAMMOGRAM FOR MALIGNANT NE                     

 

 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
            INTRAMURAL LEIOMYOMA OF UTERUS                     

 

 2016            MATHEW VALDIVIA MD            Ot            N72  
          INFLAMMATORY DISEASE OF CERVIX UTERI                     

 

 2016            MATHEW VALDIVIA MD, Ot            
N83.01            FOLLICULAR CYST OF RIGHT OVARY                     

 

 2016            MATHEW VALDIVIA MD, Ot            
N83.202            UNSPECIFIED OVARIAN CYST, LEFT SIDE                     

 

 2016            MATHEW VALDIVIA MD, Ot            N83.8
            OTH NONINFLAMMATORY DISORD OF OVARY, FAL                     

 

 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
            INTRAMURAL LEIOMYOMA OF UTERUS                     

 

 2016            MATHEW VALDIVIA MD, Ot            N72  
          INFLAMMATORY DISEASE OF CERVIX UTERI                     

 

 2016            MATHEW VALDIVIA MD, Ot            
N83.01            FOLLICULAR CYST OF RIGHT OVARY                     

 

 2016            MATHEW VALDIVIA MD, Ot            
N83.202            UNSPECIFIED OVARIAN CYST, LEFT SIDE                     

 

 2016            MATHEW VALDIVIA MD, Ot            N83.8
            OTH NONINFLAMMATORY DISORD OF OVARY, FAL                     

 

 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
            INTRAMURAL LEIOMYOMA OF UTERUS                     

 

 2017            MATHEW VALDIVIA MD, Ot            N72  
          INFLAMMATORY DISEASE OF CERVIX UTERI                     

 

 2017            MATHEW VALDIVIA MD, Ot            
N83.01            FOLLICULAR CYST OF RIGHT OVARY                     

 

 2017            MATHEW VALDIVIA MD, Ot            
N83.202            UNSPECIFIED OVARIAN CYST, LEFT SIDE                     

 

 2017            MATHEW VALDIVIA MD, Ot            N83.8
            OTH NONINFLAMMATORY DISORD OF OVARY, FAL                     

 

 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
            INTRAMURAL LEIOMYOMA OF UTERUS                     

 

 2017            MATHEW VALDIVIA MD, Ot            N72  
          INFLAMMATORY DISEASE OF CERVIX UTERI                     

 

 2017            MATHEW VALDIVIA MD, Ot            
N83.01            FOLLICULAR CYST OF RIGHT OVARY                     

 

 2017            MATHEW VALDIVIA MD, Ot            
N83.202            UNSPECIFIED OVARIAN CYST, LEFT SIDE                     

 

 2017            MATHEW VALDIVIA MD, Ot            N83.8
            OTH NONINFLAMMATORY DISORD OF OVARY, FAL                     

 

 2017            CHASTITY NICE DO            Ot            E03.8   
         OTHER SPECIFIED HYPOTHYROIDISM                     

 

 2017            CHASTITY NICE DO            Ot            E04.2   
         NONTOXIC MULTINODULAR GOITER                     

 

 2017                         Ot            V76.12            OTH SCREEN 
MAMMO-MALIGN NEOPLASM OF SURINDER                     

 

 2017                         Ot            368.2            DIPLOPIA    
                 

 

 2017            ANAI MORGAN            Ot            300.12   
         DISSOCIATIVE AMNESIA                     

 

 2017            MATHEW VALDIVIA MD            Ot            
793.82            INCONCLUSIVE MAMMOGRAM                     

 

 2017            MATHEW VALDIVIA MD, Ot            
V76.12            OTH SCREEN MAMMO-MALIGN NEOPLASM OF SURINDER                     

 

 2017            MATHEW VALDIVIA MD            Ot            
793.80            UNSPEC ABNORMAL MAMMOGRAM                     

 

 2017            MATHEW VALDIVIA MD            Ot            
793.80            UNSPEC ABNORMAL MAMMOGRAM                     

 

 2017            MATHEW VALDIVIA MD, Ot            
V76.12            OTH SCREEN MAMMO-MALIGN NEOPLASM OF SURINDER                     

 

 2017            CHAYA CARRILLO APRN            Ot            719.41    
        JOINT PAIN-SHLDER                     

 

 2017            CHAYA CARRILLO APRN            Ot            719.41    
        JOINT PAIN-SHLDER                     

 

 2017            MATHEW VALDIVIA MD, Ot            
V76.12            OTH SCREEN MAMMO-MALIGN NEOPLASM OF SURINDER                     

 

 2017            CHAYA CARRILLO APRN            Ot            453.41    
        ACUTE VENOUS EMBOLISM   THROMBOSIS DEEP                      

 

 2017            MATHEW VALDIVIA MD, Ot            
Z12.31            ENCNTR SCREEN MAMMOGRAM FOR MALIGNANT NE                     

 

 2017            DARIANA LENTZ MD            Ot            E78.2       
     MIXED HYPERLIPIDEMIA                     

 

 2017            DARIANA LENTZ MD            Ot            I10         
   ESSENTIAL (PRIMARY) HYPERTENSION                     

 

 2017            DARIANA LENTZ MD            Ot            R00.2       
     PALPITATIONS                     

 

 2017            DARIANA LENTZ MD            Ot            Z82.49      
      FAMILY HX OF ISCHEM HEART DIS AND OTH DI                     

 

 2017            CHASTITY NICE DO            Ot            E03.8   
         OTHER SPECIFIED HYPOTHYROIDISM                     

 

 2017            CHASTITY NICE DO            Ot            E04.2   
         NONTOXIC MULTINODULAR GOITER                     

 

 2017            CHASTITY NICE DO            Ot            E03.8   
         OTHER SPECIFIED HYPOTHYROIDISM                     

 

 2017            CHASTITY NICE DO            Ot            E04.2   
         NONTOXIC MULTINODULAR GOITER                     

 

 2017            CHASTITY NICE DO            Ot            E03.8   
         OTHER SPECIFIED HYPOTHYROIDISM                     

 

 2017            CHASTITY NICE DO            Ot            E04.2   
         NONTOXIC MULTINODULAR GOITER                     

 

 2017            MATHEW VALDIVIA MD, Ot            
Z12.31            ENCNTR SCREEN MAMMOGRAM FOR MALIGNANT NE                     

 

 2017            DARIANA LENTZ MD            Ot            E78.2       
     MIXED HYPERLIPIDEMIA                     

 

 2017            DARIANA LENTZ MD            Ot            I10         
   ESSENTIAL (PRIMARY) HYPERTENSION                     

 

 2017            DARIANA LENTZ MD            Ot            R00.2       
     PALPITATIONS                     

 

 2017            DARIANA LENTZ MD            Ot            Z82.49      
      FAMILY HX OF ISCHEM HEART DIS AND OTH DI                     

 

 2017            CHAYA CARRILLO APRN            Ot            M54.5     
       LOW BACK PAIN                     

 

 2017            CHAYA CARRILLO APRN            Ot            M54.6     
       PAIN IN THORACIC SPINE                     

 

 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    
        OTHER INTERVERTEBRAL DISC DEGENERATION,                      

 

 2017            MATHEW VALDIVIA MD            Ot            
Z12.31            ENCNTR SCREEN MAMMOGRAM FOR MALIGNANT NE                     

 

 2018            AMRIT ARELLANO MD            Ot            Z01.818     
       ENCOUNTER FOR OTHER PREPROCEDURAL EXAMIN                     

 

 2018            AMRIT ARELLANO MD            Ot            Z12.11      
      ENCOUNTER FOR SCREENING FOR MALIGNANT NE                     

 

 2018            AMRIT ARELLANO MD            Ot            Z80.0       
     FAMILY HISTORY OF MALIGNANT NEOPLASM OF                      

 

 2018            AMRIT ARELLANO MD            Ot            Z01.818     
       ENCOUNTER FOR OTHER PREPROCEDURAL EXAMIN                     

 

 2018            AMRIT ARELLANO MD            Ot            Z12.11      
      ENCOUNTER FOR SCREENING FOR MALIGNANT NE                     

 

 2018            AMRIT ARELLANO MD            Ot            Z80.0       
     FAMILY HISTORY OF MALIGNANT NEOPLASM OF                      

 

 2018                         Ot            368.2            DIPLOPIA    
                 

 

 2018            ANAI MORGAN            Ot            300.12   
         DISSOCIATIVE AMNESIA                     

 

 2018            MATHEW VALDIVIA MD            Ot            
793.82            INCONCLUSIVE MAMMOGRAM                     

 

 2018            MATHEW VALDIVIA MD            Ot            
V76.12            OTH SCREEN MAMMO-MALIGN NEOPLASM OF SURINDER                     

 

 2018            MATHEW VALDIVIA MD            Ot            
793.80            UNSPEC ABNORMAL MAMMOGRAM                     

 

 2018            MATHEW VALDIVIA MD            Ot            
793.80            UNSPEC ABNORMAL MAMMOGRAM                     

 

 2018            MATHEW VALDIVIA MD            Ot            
V76.12            OTH SCREEN MAMMO-MALIGN NEOPLASM OF SURINDER                     

 

 2018            CHAYA CARRILLO APRN            Ot            719.41    
        JOINT PAIN-SHLDER                     

 

 2018            CARRILLO, ASHDEN N APRN            Ot            719.41    
        JOINT PAIN-SHLDER                     

 

 2018            MATHEW VALDIVIA MD            Ot            
V76.12            OTH SCREEN MAMMO-MALIGN NEOPLASM OF SURINDER                     

 

 2018            CHAYA CARRILLO APRN            Ot            453.41    
        ACUTE VENOUS EMBOLISM   THROMBOSIS DEEP                      

 

 2018            MATHEW VALDIVIA MD            Ot            
Z12.31            ENCNTR SCREEN MAMMOGRAM FOR MALIGNANT NE                     

 

 2018            TOR WILSON, DARIANA ALVARENGA            Ot            E78.2       
     MIXED HYPERLIPIDEMIA                     

 

 2018            TOR WILSON, DARIANA ALVARENGA            Ot            I10         
   ESSENTIAL (PRIMARY) HYPERTENSION                     

 

 2018            TOR WILSON, DARIANA ALVARENGA            Ot            R00.2       
     PALPITATIONS                     

 

 2018            TOR WILSON, DARIANA ALVARENGA            Ot            Z82.49      
      FAMILY HX OF ISCHEM HEART DIS AND OTH DI                     

 

 2018            CHASTITY NICE DO            Ot            E03.8   
         OTHER SPECIFIED HYPOTHYROIDISM                     

 

 2018            CHASTITY NICE DO            Ot            E04.2   
         NONTOXIC MULTINODULAR GOITER                     

 

 2018            CHAYA CARRILLO APRN            Ot            M54.5     
       LOW BACK PAIN                     

 

 2018            CHAYA CARRILLO APRN            Ot            M54.6     
       PAIN IN THORACIC SPINE                     

 

 2018            CHAYA CARRILLO APRN            Ot            M51.36    
        OTHER INTERVERTEBRAL DISC DEGENERATION,                      

 

 2018            MATHEW VALDIVIA MD            Ot            
Z12.31            ENCNTR SCREEN MAMMOGRAM FOR MALIGNANT NE                     

 

 2018            AMRIT ARELLANO MD            Ot            K62.1       
     RECTAL POLYP                     

 

 2018            AMRIT ARELLANO MD            Ot            Z12.11      
      ENCOUNTER FOR SCREENING FOR MALIGNANT NE                     

 

 2018            AMRIT ARELLANO MD            Ot            Z79.899     
       OTHER LONG TERM (CURRENT) DRUG THERAPY                     

 

 2018            AMRIT ARELLANO MD            Ot            Z80.0       
     FAMILY HISTORY OF MALIGNANT NEOPLASM OF                      

 

 2018            AMRIT ARELLANO MD            Ot            K62.1       
     RECTAL POLYP                     

 

 2018            AMRIT ARELLANO MD            Ot            Z12.11      
      ENCOUNTER FOR SCREENING FOR MALIGNANT NE                     

 

 2018            AMRIT ARELLANO MD            Ot            Z79.899     
       OTHER LONG TERM (CURRENT) DRUG THERAPY                     

 

 2018            AMRIT ARELLANO MD            Ot            Z80.0       
     FAMILY HISTORY OF MALIGNANT NEOPLASM OF                      

 

 2018            AMRIT ARELLANO MD            Ot            K62.1       
     RECTAL POLYP                     

 

 2018            AMRIT ARELLANO MD            Ot            Z12.11      
      ENCOUNTER FOR SCREENING FOR MALIGNANT NE                     

 

 2018            EILEEN WILSON, AMRIT PEREZ            Ot            Z79.899     
       OTHER LONG TERM (CURRENT) DRUG THERAPY                     

 

 2018            AMRIT ARELLANO MD            Ot            Z80.0       
     FAMILY HISTORY OF MALIGNANT NEOPLASM OF                      

 

 2018            AMRIT ARELLANO MD            Ot            K62.1       
     RECTAL POLYP                     

 

 2018            AMRIT ARELLANO MD            Ot            Z12.11      
      ENCOUNTER FOR SCREENING FOR MALIGNANT NE                     

 

 2018            AMRIT ARELLANO MD            Ot            Z79.899     
       OTHER LONG TERM (CURRENT) DRUG THERAPY                     

 

 2018            AMRIT ARELLANO MD            Ot            Z80.0       
     FAMILY HISTORY OF MALIGNANT NEOPLASM OF                      

 

 07/10/2018                         Ot            368.2            DIPLOPIA    
                 

 

 07/10/2018            ANAI MORGAN            Ot            300.12   
         DISSOCIATIVE AMNESIA                     

 

 07/10/2018            MATHEW VALDIVIA MD            Ot            
793.82            INCONCLUSIVE MAMMOGRAM                     

 

 07/10/2018            MATHEW VALDIVIA MD, Ot            
V76.12            OTH SCREEN MAMMO-MALIGN NEOPLASM OF SURINDER                     

 

 07/10/2018            MATHEW VALDIVIA MD            Ot            
793.80            UNSPEC ABNORMAL MAMMOGRAM                     

 

 07/10/2018            MATHEW VALDIVIA MD            Ot            
793.80            UNSPEC ABNORMAL MAMMOGRAM                     

 

 07/10/2018            MATHEW VALDIVIA MD, Ot            
V76.12            OTH SCREEN MAMMO-MALIGN NEOPLASM OF SURINDER                     

 

 07/10/2018            CHAYA CARRILLO APRN            Ot            719.41    
        JOINT PAIN-SHLDER                     

 

 07/10/2018            CHAYA CARRILLO APRN            Ot            719.41    
        JOINT PAIN-SHLDER                     

 

 07/10/2018            MATHEW VALDIVIA MD, Ot            
V76.12            OTH SCREEN MAMMO-MALIGN NEOPLASM OF SURINDER                     

 

 07/10/2018            CHAYA CARRILLO APRN            Ot            453.41    
        ACUTE VENOUS EMBOLISM   THROMBOSIS DEEP                      

 

 07/10/2018            MATHEW VALDIVIA MD            Ot            
Z12.31            ENCNTR SCREEN MAMMOGRAM FOR MALIGNANT NE                     

 

 07/10/2018            DARIANA LENTZ MD            Ot            E78.2       
     MIXED HYPERLIPIDEMIA                     

 

 07/10/2018            DARIANA LENTZ MD            Ot            I10         
   ESSENTIAL (PRIMARY) HYPERTENSION                     

 

 07/10/2018            DARIANA LENTZ MD            Ot            R00.2       
     PALPITATIONS                     

 

 07/10/2018            DARIANA LENTZ MD            Ot            Z82.49      
      FAMILY HX OF ISCHEM HEART DIS AND OTH DI                     

 

 07/10/2018            CHASTITY NICE DO            Ot            E03.8   
         OTHER SPECIFIED HYPOTHYROIDISM                     

 

 07/10/2018            CHASTITY NICE DO            Ot            E04.2   
         NONTOXIC MULTINODULAR GOITER                     

 

 07/10/2018            CHAYA CARRILLO APRN            Ot            M54.5     
       LOW BACK PAIN                     

 

 07/10/2018            CHAYA CARRILLO APRN            Ot            M54.6     
       PAIN IN THORACIC SPINE                     

 

 07/10/2018            CHAYA CARRILLO APRN            Ot            M51.36    
        OTHER INTERVERTEBRAL DISC DEGENERATION,                      

 

 07/10/2018            MATHEW VALDIVIA MD, Ot            
Z12.31            ENCNTR SCREEN MAMMOGRAM FOR MALIGNANT NE                     

 

 10/29/2018            MATHEW VALDIVIA MD            Ot            
793.82            INCONCLUSIVE MAMMOGRAM                     

 

 10/29/2018            MATHEW VALDIVIA MD, Ot            
V76.12            OTH SCREEN MAMMO-MALIGN NEOPLASM OF SURINDER                     

 

 10/29/2018            MATHEW VALDIVIA MD            Ot            
793.80            UNSPEC ABNORMAL MAMMOGRAM                     

 

 10/29/2018            MATHEW VALDIVIA MD            Ot            
793.80            UNSPEC ABNORMAL MAMMOGRAM                     

 

 10/29/2018            MATHEW VALDIVIA MD, Ot            
V76.12            OTH SCREEN MAMMO-MALIGN NEOPLASM OF SURINDER                     

 

 10/29/2018            CHAYA CARRILLO APRN            Ot            719.41    
        JOINT PAIN-SHLDER                     

 

 10/29/2018            CHAYA CARRILLO APRN            Ot            719.41    
        JOINT PAIN-SHLDER                     

 

 10/29/2018            MATHEW VALDIVIA MD, Ot            
V76.12            OTH SCREEN MAMMO-MALIGN NEOPLASM OF SURINDER                     

 

 10/29/2018            CHAYA CARRILLO APRN            Ot            453.41    
        ACUTE VENOUS EMBOLISM   THROMBOSIS DEEP                      

 

 10/29/2018            MATHEW VALDIVIA MD, Ot            
Z12.31            ENCNTR SCREEN MAMMOGRAM FOR MALIGNANT NE                     

 

 10/29/2018            DARIANA LENTZ MD            Ot            E78.2       
     MIXED HYPERLIPIDEMIA                     

 

 10/29/2018            DARIANA LENTZ MD            Ot            I10         
   ESSENTIAL (PRIMARY) HYPERTENSION                     

 

 10/29/2018            DARIANA LENTZ MD            Ot            R00.2       
     PALPITATIONS                     

 

 10/29/2018            DARIANA LENTZ MD            Ot            Z82.49      
      FAMILY HX OF ISCHEM HEART DIS AND OTH DI                     

 

 10/29/2018            CHASTITY NICE DO            Ot            E03.8   
         OTHER SPECIFIED HYPOTHYROIDISM                     

 

 10/29/2018            CHASTITY NICE DO            Ot            E04.2   
         NONTOXIC MULTINODULAR GOITER                     

 

 10/29/2018            CHAYA CARRILLO            Ot            M54.5     
       LOW BACK PAIN                     

 

 10/29/2018            CHAYA CARRILLO            Ot            M54.6     
       PAIN IN THORACIC SPINE                     

 

 10/29/2018            CHAYA CARRILLO APRMICHELL            Ot            M51.36    
        OTHER INTERVERTEBRAL DISC DEGENERATION,                      

 

 10/29/2018            MATHEW VALDIVIA MD, Ot            
Z12.31            ENCNTR SCREEN MAMMOGRAM FOR MALIGNANT NE                     

 

 10/29/2018            MATHEW VALDIVIA MD, Ot            
Z12.31            ENCNTR SCREEN MAMMOGRAM FOR MALIGNANT NE                     

 

 2018            MADDISON NORRIS            Ot            M79.605   
         PAIN IN LEFT LEG                     

 

 2018            MADDISON NORRIS            Ot            R20.2     
       PARESTHESIA OF SKIN                     

 

 2018            DARIANA LENTZ MD            Ot            E78.5       
     HYPERLIPIDEMIA, UNSPECIFIED                     

 

 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                     

 

 2018            DARIANA LENTZ MD            Ot            I10         
   ESSENTIAL (PRIMARY) HYPERTENSION                     

 

 2018            DARIANA LENTZ MD            Ot            I82.409     
       ACUTE EMBOLISM AND THOMBOS UNSP DEEP VN                      

 

 2018            DARIANA LENTZ MD            Ot            R00.2       
     PALPITATIONS                     

 

 2019            JENNIFER VELARDE            Ot            
E78.5            HYPERLIPIDEMIA, UNSPECIFIED                     

 

 2019            JENNIFER VELARDE            Ot            
I10            ESSENTIAL (PRIMARY) HYPERTENSION                     

 

 2019            JENNIFER VELARDE            Ot            
R00.2            PALPITATIONS                     

 

 2019            JENNIFER VELARDE            Ot            
E78.5            HYPERLIPIDEMIA, UNSPECIFIED                     

 

 2019            JENNIFER VELARDE            Ot            
I10            ESSENTIAL (PRIMARY) HYPERTENSION                     

 

 2019            JENNIFRE VELARDE            Ot            
R00.2            PALPITATIONS                     



                                                                               
                                                                               
                                                                               
                                                                               
                                                                               
                                                                               
                                                                               
   



Procedures

      





 Code            Description            Performed By            Performed On   
     

 

             47.01                                  [facility] APPENDECTOMY    
                               2010        



                  



Results

      





 Test            Result            Range        









 Complete blood count (CBC) with automated white blood cell (WBC) differential 
- 16 15:45         









 Blood leukocytes automated count (number/volume)            9.3 10*3/uL       
     4.3-11.0        

 

 Blood erythrocytes automated count (number/volume)            4.32 10*6/uL    
        4.35-5.85        

 

 Venous blood hemoglobin measurement (mass/volume)            13.4 g/dL        
    11.5-16.0        

 

 Blood hematocrit (volume fraction)            40 %            35-52        

 

 Automated erythrocyte mean corpuscular volume            92 [foz_us]          
  80-99        

 

 Automated erythrocyte mean corpuscular hemoglobin (mass per erythrocyte)      
      31 pg            25-34        

 

 Automated erythrocyte mean corpuscular hemoglobin concentration measurement (
mass/volume)            34 g/dL            32-36        

 

 Automated erythrocyte distribution width ratio            12.9 %            
10.0-14.5        

 

 Automated blood platelet count (count/volume)            224 10*3/uL          
  130-400        

 

 Automated blood platelet mean volume measurement            11.1 [foz_us]     
       7.4-10.4        

 

 Automated blood neutrophils/100 leukocytes            70 %            42-75   
     

 

 Automated blood lymphocytes/100 leukocytes            22 %            12-44   
     

 

 Blood monocytes/100 leukocytes            7 %            0-12        

 

 Automated blood eosinophils/100 leukocytes            1 %            0-10     
   

 

 Automated blood basophils/100 leukocytes            0 %            0-10        

 

 Blood neutrophils automated count (number/volume)            6.5 10*3         
   1.8-7.8        

 

 Blood lymphocytes automated count (number/volume)            2.0 10*3         
   1.0-4.0        

 

 Blood monocytes automated count (number/volume)            0.7 10*3            
0.0-1.0        

 

 Automated eosinophil count            0.1 10*3/uL            0.0-0.3        

 

 Automated blood basophil count (count/volume)            0.0 10*3/uL          
  0.0-0.1        









 Blood type T Indirect antibody screen panel - 16 15:45         









 ABO+Rh group            OP             NRG        

 

 Transfusion band number            TNP             NRG        

 

 Blood group antibody screen            NEGATIVE             NRG        









 Methicillin resistant Staphylococcus aureus (MRSA) screening culture -  15:45         









 Methicillin resistant Staphylococcus aureus (MRSA) screening culture          
  NEG             NRG        









 Urine beta human chorionic gonadotropin (hCG) measurement - 16 11:00    
     









 Urine beta human chorionic gonadotropin (hCG) measurement            NEGATIVE 
            NEGATIVE        









 Blood type T Indirect antibody screen panel - 16 11:10         









 ABO+Rh group            OP             NRG        

 

 Transfusion band number            [id number]             NRG        

 

 Blood group antibody screen            NEGATIVE             NRG        









 IONIZED CALCIUM (SEND OFF) - 17 14:30         









 Blood ionized calcium measurement (mass/volume)            1.16 %            
1.16-1.32        

 

 Venous blood ionized calcium measurement adjusted to pH 7.4 (moles/volume)    
        1.16 %            1.16-1.32        

 

 pH measurement            7.39             NRG        



                          



Encounters

      





 ACCT No.            Visit Date/Time            Discharge            Status    
        Pt. Type            Provider            Facility            Loc./Unit  
          Complaint        

 

 [id number]            2019 07:30:00            2019 23:59:59    
        CLS            Outpatient            JENNIFER VELARDE    
        [facility]            CARD            DVT,HEART 
PALPITATIONS        

 

 [id number]            2018 11:52:00            2018 23:59:59    
        CLS            Outpatient            DARIANA LENTZ MD            [facility]            CARD            DVT,HTN,PALPITATIONS   
     

 

 [id number]            10/29/2018 11:11:00            10/29/2018 23:59:59    
        CLS            Outpatient            MADDISON NORRIS            
[facility]            RAD            PAIN IN LT LEG      
  

 

 [id number]            07/10/2018 09:39:00            07/10/2018 23:59:59    
        CLS            Outpatient            MATHEW VALDIVIA MD         
   [facility]            RAD            ROUTINE SCREENING
        

 

 [id number]            2018 06:57:00            2018 10:30:00    
        DIS            Outpatient            AMRIT ARELLANO MD            [facility]            ENDO            SCREENING/FAMILY HX 
COLON CA        

 

 [id number]            2018 05:42:00            2018 12:40:00    
        DIS            Outpatient            AMRIT ARELLANO MD            [facility]            PREOP            COLONOSCOPY        

 

 [id number]            2017 10:26:00            2017 23:59:59    
        CLS            Outpatient            MATHEW VALDIVIA MD         
   [facility]            RAD            ROUTINE SCREENING
        

 

 [id number]            2017 16:02:00            2017 23:59:59    
        CLS            Outpatient            CHAYA CARRILLO            
[facility]            RAD            LOW BACK PAIN        

 

 [id number]            07/10/2017 14:58:00            07/10/2017 23:59:59    
        CLS            Outpatient            CHAYA CARRILLO            
[facility]            RAD            LOW BACK PAIN        

 

 [id number]            2017 14:11:00            2017 23:59:59    
        CLS            Outpatient            CHASTITY NICE DO            
[facility]            LAB            GOITER MULTINODULAR,
HYPOTHYROIDISM        

 

 [id number]            2016 10:58:00            2016 10:05:00    
        DIS            Outpatient            MATHEW VALDIVIA MD         
   [facility]            SDC            DYSFUNCTIONAL 
UTERINE BLEEDING        

 

 [id number]            2016 15:20:00            2016 15:50:00    
        DIS            Outpatient            MATHEW VALDIVIA MD         
   [facility]            PREOP            DYSFUNCTIONAL 
UTERINE BLEEDING        

 

 [id number]            2016 09:46:00            2016 23:59:59    
        CLS            Outpatient            DARIANA LENTZ MD            [facility]            CARD            DVT,HLP,HTN,PALPITATIONS
,FAM HX OF CAD        

 

 [id number]            2016 11:25:00            2016 23:59:59    
        CLS            Outpatient            MATHEW VALDIVIA MD         
   [facility]            RAD            ROUTINE        

 

 [id number]            2015 09:48:00            2015 23:59:59    
        CLS            Outpatient            MATHEW VALDIVIA MD         
   [facility]            RAD            SCREENING        

 

 [id number]            2015 15:58:00            2015 23:59:59    
        CLS            Outpatient            CHAYA CARRILLO            
[facility]            RAD            PAIN AND REDNESS AND 
SWELING OF LF LEG        

 

 [id number]            2014 12:11:00            2014 23:59:59    
        CLS            Outpatient            CHAYA CARRILLO APRN            
[facility]            RAD            SHOULDER PAIN        

 

 [id number]            2014 09:36:00            2014 23:59:59    
        CLS            Outpatient            CHAYA CARRILLO            
[facility]            RAD            SHOULDER PAIN        

 

 [id number]            2014 08:53:00            2014 23:59:59    
        CLS            Outpatient            MATHEW VALDIVIA MD         
   [facility]            RAD            SCREENING        

 

 [id number]            2014 14:07:00            2014 23:59:59    
        CLS            Outpatient            MATHEW VALDIVIA MD         
   [facility]            RAD            6 MONTH FOLLOW UP
        

 

 [id number]            2013 14:13:00            2013 23:59:59    
        CLS            Outpatient            MATHEW VALDIVIA MD         
   [facility]            RAD            ABN MAMMOGRAM    
    

 

 [id number]            07/15/2013 10:20:00            07/15/2013 23:59:59    
        CLS            Outpatient            MATHEW VALDIVIA MD         
   [facility]            RAD            SCREENING        

 

 [id number]            2013 12:38:00            2013 23:59:59    
        CLS            Outpatient            ANAI MORGAN            
[facility]            RAD            MEMORY LOSS        

 

 [id number]            2019 08:46:00                         ACT       
     Outpatient            DARIANA LENTZ MD            [facility]            CATH            ABN STRESS TEST,CP,HTN        

 

 [id number]            2013 15:04:00                                   
   Document Registration                                                       
     

 

 [id number]            06/15/2012 14:04:00                                   
   Document Registration                                                       
     

 

 [id number]            2011 09:24:00                                   
   Document Registration                                                       
     

 

 [id number]            2010 02:17:00                                   
   Document Registration

## 2019-02-13 NOTE — DISCHARGE INST-POST CATH
Discharge Inst-CATH/EP


Post Cardiac Cath/EP D/C Inst


Follow Up/Plan


Appointment with Dr. Nino's office in 4 weeks


CARDIAC CATH DISCHARGE INSTRUCTIONS





*Hold Metformin for 48 hours post heart cath.





ACTIVITY





* Go Home directly and rest.


* Limit activity of the leg (or wrist if it was used) for 7 days including 

aerobics, swimming,


   jogging, bicycling, etc.


* Restrict stair-climbing for 7 days if possible, if not, climb up with your non

-cath leg, then


   bring together on the same step.


* Avoid lifting, pushing, pulling or excessive movement of the affected 

extremity for 7 days.


* Customary sexual activity may be resumed after 2 days-use caution not to use 

a position  


   that strains or causes pain to the affected extremity.


* No driving for 24 hours.


* NO SMOKING. 


* Avoid straining for bowel movements for 7 days.


* Gentle walking on level ground is allowed.


* Returning to work will depend on the type of procedure and the results. Your 

doctor will discuss


   this with you.





CALL YOUR DOCTOR FOR ANY OF THE FOLLOWING:





*If bleeding from the puncture site occurs- Apply gentle pressure to site with 

clean cloth and call


   your doctor or EMS.


* If a knot or lump forms under the skin, increases in size, or causes pain.


* If bruising appears to be worsening or moving further down your leg instead 

of disappearing.


* Temperature above 101 F.





CARE OF YOUR GROIN INCISION;





* Bruising or purple discoloration of the skin near the puncture site is common.


* You may shower only, no bathtub bathing for 5 days.  Be careful to avoid 

slipping as your


   leg may feel stiff.


* If a closure device was used on your femoral artery, please see the attached 

guide regarding


   care of the device and your leg.


* Leave the dressing on, until removed by office staff.





CARE OF YOUR WRIST INCISION;





* Bruising or purple discoloration of the skin near the puncture site is common.


* You may shower.


* DO NOT submerge wrist.


* Leave dressing on, until removed by office staff..











DARIANA NINO MD Feb 13, 2019 11:17

## 2019-02-13 NOTE — XMS REPORT
Clinical Summary

 Created on: 2019



Morales Yoonelle

External Reference #: [id number]

: 1965

Sex: Female



Demographics







 Address  [address] [place]  [address]

 

 Home Phone  [phone]

 

 Preferred Language  English

 

 Marital Status  Unknown

 

 Islam Affiliation  CAT

 

 Race  Unknown

 

 Ethnic Group  Not  or 





Author







 Author  [facility]

 

 Organization  [facility]

 

 Address  Unknown

 

 Phone  Unavailable







Support







 Name  Relationship  Address  Phone

 

 JESUS YOON  Unknown  [phone]







Care Team Providers







 Care Team Member Name  Role  Phone

 

 Aneudy Padilla MD  PCP  [phone]

 

 Rosamaria Haywood MD  Unavailable  [phone]







Source Comments

Some departments are not documenting in the electronic medical record.  If you 
do not see the information that you expected, contact Release of Information in 
the Health Information Management department at [phone] for further 
assistance in locating additional records.[facility]



Allergies

Not on File



Medications







      End Date  Status



  Medication   Sig   Dispensed   Refills   Start  



      Date  

 

         Active



  rosuvastatin (CRESTOR) 10   Take 10 mg by    0   



  mg tablet   mouth daily.     

 

         Active



  nebivolol (BYSTOLIC) 5 mg   Take 5 mg by    0   



  tablet   mouth daily.     

 

         Active



  aspirin EC 81 mg tablet   Take 81 mg by    0   



   mouth daily.     

 

         Active



  cyanocobalamin (VITAMIN   Take 1,000    0   



  B-12) 1,000 mcg tablet   mcg by mouth     



   daily.     

 

         Active



  CALCIUM CARBONATE   Take  by    0   



  (CALTRATE 600 PO)   mouth.     

 

         Active



  MULTIVIT WITH   Take 1 Tab by    0   



  CALCIUM,IRON,MIN   mouth daily.     



  (ONE-A-DAY WOMENS FORMULA      



  PO)      

 

         Active



  zolpidem CR (AMBIEN CR)   Take 12.5 mg    0   



  12.5 mg tablet   by mouth at     



   bedtime as     



   needed.     

 

         Active



  methimazole (TAPAZOLE) 5   Take 1 Tab by   30 Tab   11   /201  



  mg tablet   mouth daily.     2  







Active Problems







 



  Problem   Noted Date

 

 



  Thyroid dysfunction   05/15/2012

 

 



  Hyperthyroidism   05/15/2012

 

 



  Goiter   05/15/2012

 

 



  HTN (hypertension)   05/15/2012







Family History







   



  Medical History   Relation   Name   Comments

 

   



  Hypertension   Mother  

 

   



  Thyroid Disease   Mother  









   



  Relation   Name   Status   Comments

 

   



  Father     



    (Age 50) 

 

   



  Mother    Alive 







Social History







     Date



  Tobacco Use   Types   Packs/Day   Years Used 

 

      



  Never Smoker    

 

    



  Smokeless Tobacco: Never   



  Used   









 Tobacco Cessation: Counseling Given: No











 



  Sex Assigned at Birth   Date Recorded

 

 



  Not on file 









   Industry



  Job Start Date   Occupation 

 

   Not on file



  Not on file   Not on file 









   Travel End



  Travel History   Travel Start 













  No recent travel history available.







Last Filed Vital Signs







   Time Taken



  Vital Sign   Reading 

 

   05/15/2012 12:52 PM CDT



  Blood Pressure   108/61 

 

   05/15/2012 12:52 PM CDT



  Pulse   75 

 

   -



  Temperature   - 

 

   -



  Respiratory Rate   - 

 

   -



  Oxygen Saturation   - 

 

   -



  Inhaled Oxygen   - 



  Concentration  

 

   05/15/2012 12:52 PM CDT



  Weight   67.9 kg (149 lb 9.6 oz) 

 

   -



  Height   - 

 

   -



  Body Mass Index   - 







Plan of Treatment







   



  Health Maintenance   Due Date   Last Done   Comments

 

   



  HEPATITIS C SCREENING   1965  

 

   



  PHYSICAL (COMPREHENSIVE)   1972  



  EXAM   

 

   



  HIV SCREENING   1980  

 

   



  DTAP/TDAP VACCINES (1 -   1983  



  Tdap)   

 

   



  CERVICAL CANCER SCREENING   1995  

 

   



  BREAST CANCER SCREENING   2005  

 

   



  COLORECTAL CANCER   2015  



  SCREENING   

 

   



  SHINGLES RECOMBINANT   2015  



  VACCINE (1 of 2)   

 

   



  INFLUENZA VACCINE   2018  







Results

Not on filefrom Last 3 Months

## 2019-02-13 NOTE — DIAGNOSTIC IMAGING REPORT
INDICATION: Abnormal stress test. Chest pain. Hypertension.



COMPARISON: 11/02/2010



FINDINGS: Single frontal view of the chest demonstrates normal

heart size and pulmonary vascularity. The lungs are well aerated

and clear. No large pleural effusion or pneumothorax is seen. The

visualized osseous structures show no acute abnormalities.



IMPRESSION: 

1. No acute cardiopulmonary process.  



Dictated by: 



  Dictated on workstation # [id number]

## 2019-02-13 NOTE — CARDIAC CATH REPORT
Cardiac Cath Report


Physician (s)/Assistant (s)


Physician


DARIANA LENTZ MD





Pre-Procedure Diagnosis


Pre-Procedure Diagnosis:  coronary artery disease





Post-Procedure Note


Procedure Start Date:  Feb 13, 2019


Name of Procedure:  


left heart catheterization


Findings/Procedure Note


PROCEDURE NOTE:


After explaining the procedure to the patient, all pros and cons were explained

, all questions were answered.  The patient signed the consent and then she  

was placed on the cardiac catheterization laboratory. Groin was prepped SL 

fashion local anesthesia was used. Sheath placed in the right femoral artery. 

Tanner right and left catheter were used to access the coronary system. 

Pigtail was used to access the left ventricular cavity. 


Left ventriculogram was not done, pressure was measured





At the end of the procedure the sheath was removed. Closure device was not used





FINDINGS:





Hemodynamics 


/16, end-diastolic pressure of 16


Aorta 169/84 mean of 114





ANATOMY:


Left Main is free of obstructive disease


Left Anterior Descending has mild disease nonobstructive disease


Left Circumflex is dominant artery with mild disease, nonobstructive disease


Right Coronory Artery has mild disease nonobstructive disease





CONCLUSION:


1.  Mild coronary artery disease nonobstructive disease


2.  Hypertension with mild elevation in left ventricular end-diastolic pressure





DISCUSSION AND RECOMMENDATION:


continue to maximize medical therapy, no intervention is needed


Anesthesia Type:  Conscious Sedation


Estimated blood loss (mL):  15 ml


Contrast Amount:  30 ml


Total Radiation Dose:  195 mGy





Post-Procedure Diagnosis


Post-operative diagnosis:  


Chest pain


Coronary artery disease


Hypertension


Hyperlipidemia











DARIANA LENTZ MD Feb 13, 2019 11:21 Only has 1 pill left of rx. Had to sched px w/ for Monday evening. Since Dr. Dimas Anderson no longer does evening appts.

## 2019-08-02 ENCOUNTER — HOSPITAL ENCOUNTER (OUTPATIENT)
Dept: HOSPITAL 75 - RAD | Age: 54
End: 2019-08-02
Attending: OBSTETRICS & GYNECOLOGY
Payer: COMMERCIAL

## 2019-08-02 DIAGNOSIS — Z12.31: Primary | ICD-10-CM

## 2019-08-02 PROCEDURE — 77067 SCR MAMMO BI INCL CAD: CPT

## 2019-08-05 NOTE — DIAGNOSTIC IMAGING REPORT
INDICATION: 

Routine screening.



COMPARISON:   

08/01/2018 and 07/28/2017.



TECHNIQUE: 

2D and 3D bilateral screening mammography was performed with CAD.



FINDINGS:

Both breasts are heterogeneously dense, limiting the sensitivity

of mammography. Benign-appearing nodules in the upper outer right

breast at posterior depth appear to be stable and likely

represents a cluster of cysts. No spiculated mass or malignant

appearing microcalcifications are seen. Benign calcifications are

seen. The axillae are unremarkable.



IMPRESSION:   

No mammographic features suspicious for malignancy are

identified.



ACR BI-RADS Category 2: Benign findings.

Result letter will be mailed to the patient.

Note: At least 10% of breast cancer is not imaged by mammography.



Dictated by: 



  Dictated on workstation # [id number]

## 2020-06-09 ENCOUNTER — HOSPITAL ENCOUNTER (OUTPATIENT)
Dept: HOSPITAL 75 - CARD | Age: 55
End: 2020-06-09
Attending: PHYSICIAN ASSISTANT
Payer: COMMERCIAL

## 2020-06-09 DIAGNOSIS — E78.2: ICD-10-CM

## 2020-06-09 DIAGNOSIS — R42: Primary | ICD-10-CM

## 2020-06-09 DIAGNOSIS — R00.2: ICD-10-CM

## 2020-06-09 LAB
BUN/CREAT SERPL: 18
CREAT SERPL-MCNC: 0.73 MG/DL (ref 0.6–1.3)
GFR SERPLBLD BASED ON 1.73 SQ M-ARVRAT: > 60 ML/MIN

## 2020-06-09 PROCEDURE — 93226 XTRNL ECG REC<48 HR SCAN A/R: CPT

## 2020-06-09 PROCEDURE — 82565 ASSAY OF CREATININE: CPT

## 2020-06-09 PROCEDURE — 36415 COLL VENOUS BLD VENIPUNCTURE: CPT

## 2020-06-09 PROCEDURE — 84520 ASSAY OF UREA NITROGEN: CPT

## 2020-06-09 PROCEDURE — 70498 CT ANGIOGRAPHY NECK: CPT

## 2020-06-09 PROCEDURE — 70496 CT ANGIOGRAPHY HEAD: CPT

## 2020-06-09 PROCEDURE — 93225 XTRNL ECG REC<48 HRS REC: CPT

## 2020-06-09 NOTE — DIAGNOSTIC IMAGING REPORT
EXAMINATION: CT angiography head and neck with and without

contrast.



TECHNIQUE: After intravenous administration of contrast, thin

section axial CT angiography of the head and neck was performed.

Source data was reformatted into 3D MIP projections. All CT scans

use one or more of the following dose optimizing techniques:

automated exposure control, MA and/or KvP adjustment based on a

patient size and exam type, or iterative reconstruction. 



HISTORY: Carotid stenosis, abnormal carotid Doppler. Dizziness

and giddiness.



COMPARISON: None available.



FINDINGS: 



The middle cerebral arteries are normal. The posterior cerebral

arteries are normal. The anterior cerebral arteries are normal.

There is no large vessel occlusion. No aneurysm or vascular

malformation is seen. 



The origins of the carotid arteries are normal. There are normal

carotid bifurcations bilaterally. Internal carotid artery course

and caliber is normal without stenosis or aneurysm. Vertebral

artery origins are normal. No stenosis is seen in the vertebral

arteries. 



The gray-white matter differentiation is normal. No mass effect

or midline shift. The ventricles are normal in size and

configuration. Basilar cisterns are patent. There are no intra-

or extra-axial fluid collections. There is no intracranial

hemorrhage. 



The orbits are normal. There is left maxillary sinus mucosal

disease. Mastoid air cells are clear. No soft tissue abnormality

is seen. No osseus lesions or fractures are seen.



No lymphadenopathy is seen in the neck. Pharynx and larynx appear

normal. Salivary glands are symmetric. Musculature and fascia

planes are normal. Limited views of the superior thorax are

unremarkable.



No osseous lesions or fractures are seen.



IMPRESSION:



1. Normal vasculature in the head and neck without carotid

stenosis.



Dictated by: 



  Dictated on workstation # [id number]

## 2020-08-03 ENCOUNTER — HOSPITAL ENCOUNTER (OUTPATIENT)
Dept: HOSPITAL 75 - RAD | Age: 55
End: 2020-08-03
Attending: OBSTETRICS & GYNECOLOGY
Payer: COMMERCIAL

## 2020-08-03 DIAGNOSIS — Z12.31: Primary | ICD-10-CM

## 2020-08-03 PROCEDURE — 77067 SCR MAMMO BI INCL CAD: CPT

## 2020-08-03 PROCEDURE — 77063 BREAST TOMOSYNTHESIS BI: CPT

## 2020-08-03 NOTE — DIAGNOSTIC IMAGING REPORT
INDICATION: 

Routine screening.



COMPARISON:       

08/02/2019 and 08/01/2018.



TECHNIQUE: 

2D and 3D bilateral screening mammography was performed with CAD.



FINDINGS:

Both breasts remain heterogeneously dense, limiting the

sensitivity of mammography. The parenchymal pattern is stable.

The circumscribed nodules in the outer right breast are stable.

No new mass or malignant appearing microcalcifications are seen.

The axillae are unremarkable.



IMPRESSION: 

No mammographic features suspicious for malignancy are

identified.



ACR BI-RADS Category 2: Benign findings.

Result letter will be mailed to the patient.

Note: At least 10% of breast cancer is not imaged by mammography.



Dictated by: 



  Dictated on workstation # [id number]

## 2021-07-29 ENCOUNTER — HOSPITAL ENCOUNTER (OUTPATIENT)
Dept: HOSPITAL 75 - RAD | Age: 56
End: 2021-07-29
Attending: FAMILY MEDICINE
Payer: COMMERCIAL

## 2021-07-29 DIAGNOSIS — M47.816: Primary | ICD-10-CM

## 2021-07-29 PROCEDURE — 72100 X-RAY EXAM L-S SPINE 2/3 VWS: CPT

## 2021-07-29 NOTE — DIAGNOSTIC IMAGING REPORT
INDICATION: Left leg numbness



Three views of the lumbosacral spine show normal height and

alignment of the vertebral bodies. There is mild disc space

narrowing and spondylosis at L1-L2. There is mild disc space

narrowing at L4-L5. No significant degenerative facet disease is

evident. There is no fracture or other acute abnormality.



IMPRESSION: There are mild degenerative changes present with no

significant change from a prior study dated 07/10/2017.



Dictated by: 



  Dictated on workstation # [id number]

## 2021-08-10 ENCOUNTER — HOSPITAL ENCOUNTER (OUTPATIENT)
Dept: HOSPITAL 75 - RAD | Age: 56
End: 2021-08-10
Attending: OBSTETRICS & GYNECOLOGY
Payer: COMMERCIAL

## 2021-08-10 DIAGNOSIS — Z12.31: Primary | ICD-10-CM

## 2021-08-10 PROCEDURE — 77063 BREAST TOMOSYNTHESIS BI: CPT

## 2021-08-10 PROCEDURE — 77067 SCR MAMMO BI INCL CAD: CPT

## 2021-08-10 NOTE — DIAGNOSTIC IMAGING REPORT
INDICATION: 

Routine screening.



COMPARISON:      

08/03/2020 and 08/02/2019.



TECHNIQUE: 

2D and 3D bilateral screening mammography was performed with CAD.



FINDINGS:

Both breasts are heterogeneously dense, limiting the sensitivity

of mammography. The parenchymal pattern is stable. No mass or

malignant-appearing microcalcifications are seen. There are

benign calcifications present. The axillae are unremarkable.



IMPRESSION: 

No mammographic features suspicious for malignancy are

identified.



ACR BI-RADS Category 2: Benign findings.

Result letter will be mailed to the patient.

Note: At least 10% of breast cancer is not imaged by mammography.



Dictated by: 



  Dictated on workstation # [id number]

## 2021-12-07 ENCOUNTER — HOSPITAL ENCOUNTER (EMERGENCY)
Dept: HOSPITAL 75 - ER | Age: 56
Discharge: HOME | End: 2021-12-07
Payer: COMMERCIAL

## 2021-12-07 VITALS — SYSTOLIC BLOOD PRESSURE: 157 MMHG | DIASTOLIC BLOOD PRESSURE: 82 MMHG

## 2021-12-07 VITALS — BODY MASS INDEX: 32.18 KG/M2 | HEIGHT: 66.93 IN | WEIGHT: 205.03 LBS

## 2021-12-07 DIAGNOSIS — I10: Primary | ICD-10-CM

## 2021-12-07 DIAGNOSIS — E78.00: ICD-10-CM

## 2021-12-07 DIAGNOSIS — Z79.899: ICD-10-CM

## 2021-12-07 LAB
ALBUMIN SERPL-MCNC: 4 GM/DL (ref 3.2–4.5)
ALP SERPL-CCNC: 88 U/L (ref 40–136)
ALT SERPL-CCNC: 14 U/L (ref 0–55)
BASOPHILS # BLD AUTO: 0 10^3/UL (ref 0–0.1)
BASOPHILS NFR BLD AUTO: 0 % (ref 0–10)
BILIRUB SERPL-MCNC: 0.3 MG/DL (ref 0.1–1)
BUN/CREAT SERPL: 21
CALCIUM SERPL-MCNC: 8.8 MG/DL (ref 8.5–10.1)
CHLORIDE SERPL-SCNC: 105 MMOL/L (ref 98–107)
CO2 SERPL-SCNC: 24 MMOL/L (ref 21–32)
CREAT SERPL-MCNC: 0.68 MG/DL (ref 0.6–1.3)
EOSINOPHIL # BLD AUTO: 0.2 10^3/UL (ref 0–0.3)
EOSINOPHIL NFR BLD AUTO: 2 % (ref 0–10)
GFR SERPLBLD BASED ON 1.73 SQ M-ARVRAT: 90 ML/MIN
GLUCOSE SERPL-MCNC: 82 MG/DL (ref 70–105)
HCT VFR BLD CALC: 37 % (ref 35–52)
HGB BLD-MCNC: 12.2 G/DL (ref 11.5–16)
LYMPHOCYTES # BLD AUTO: 2.4 10^3/UL (ref 1–4)
LYMPHOCYTES NFR BLD AUTO: 26 % (ref 12–44)
MAGNESIUM SERPL-MCNC: 2.2 MG/DL (ref 1.6–2.4)
MANUAL DIFFERENTIAL PERFORMED BLD QL: NO
MCH RBC QN AUTO: 30 PG (ref 25–34)
MCHC RBC AUTO-ENTMCNC: 33 G/DL (ref 32–36)
MCV RBC AUTO: 92 FL (ref 80–99)
MONOCYTES # BLD AUTO: 0.6 10^3/UL (ref 0–1)
MONOCYTES NFR BLD AUTO: 6 % (ref 0–12)
NEUTROPHILS # BLD AUTO: 5.9 10^3/UL (ref 1.8–7.8)
NEUTROPHILS NFR BLD AUTO: 65 % (ref 42–75)
PLATELET # BLD: 263 10^3/UL (ref 130–400)
PMV BLD AUTO: 10.1 FL (ref 9–12.2)
POTASSIUM SERPL-SCNC: 3.8 MMOL/L (ref 3.6–5)
PROT SERPL-MCNC: 6.9 GM/DL (ref 6.4–8.2)
SODIUM SERPL-SCNC: 140 MMOL/L (ref 135–145)
T4 FREE SERPL-MCNC: 1.07 NG/DL (ref 0.7–1.48)
WBC # BLD AUTO: 9 10^3/UL (ref 4.3–11)

## 2021-12-07 PROCEDURE — 83735 ASSAY OF MAGNESIUM: CPT

## 2021-12-07 PROCEDURE — 84443 ASSAY THYROID STIM HORMONE: CPT

## 2021-12-07 PROCEDURE — 93005 ELECTROCARDIOGRAM TRACING: CPT

## 2021-12-07 PROCEDURE — 36415 COLL VENOUS BLD VENIPUNCTURE: CPT

## 2021-12-07 PROCEDURE — 85025 COMPLETE CBC W/AUTO DIFF WBC: CPT

## 2021-12-07 PROCEDURE — 80053 COMPREHEN METABOLIC PANEL: CPT

## 2021-12-07 PROCEDURE — 83880 ASSAY OF NATRIURETIC PEPTIDE: CPT

## 2021-12-07 PROCEDURE — 84439 ASSAY OF FREE THYROXINE: CPT

## 2021-12-07 NOTE — ED CARDIAC GENERAL
History of Present Illness


General


Chief Complaint:  Cardiac/General Problems


Stated Complaint:  ELEV BP


Nursing Triage Note:  


AMB TO ROOM REPORTS HAS FELT OFF TODAY AT DIFFERENT TIME TODAY HAS HAD TNGLING 


IN FINGERS TOOK B/P SYSTOLIC WAS /100 DENIES CHEST PIAN


Source:  patient


Exam Limitations:  no limitations


 (REE JARAMILLO)





History of Present Illness


Date Seen by Provider:  Dec 7, 2021


Time Seen by Provider:  17:30


Initial Comments


To ER with reports of high blood pressure.  She states that it has been quite 

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 

thinking as clearly.  No visual disturbances or speech disturbances.  She had 

some tingling briefly for a few seconds in each of her fingers on each hand.  At

this time she feels back to normal.  She takes Bystolic 5 mg and lisinopril 10 

mg for hypertension control.  She follows with Dr. Nino and has had 2 cardiac 

catheterizations without intervention.  No fevers or chills or chest pain or 

shortness of breath or nausea.  She did awaken on , 2021 and had a 

migraine so she forgot to take her medications that day.  She then restarted 

them yesterday and today as well.


Timing/Duration:  constant


Severity:  mild


Activities at Onset:  none


Prior CP/Workup:  no prior chest pain


NTG SL PTA:  No


ASA po PTA:  No


Associated Systoms:  Nausea/Vomiting (REE JARAMILLO)





Allergies and Home Medications


Allergies


Coded Allergies:  


     No Known Drug Allergies (Unverified , 3/16/18)





Patient Home Medication List


Home Medication List Reviewed:  Yes


 (REE JARAMILLO)


Calcium Carbonate/Vitamin D3 (Caltrate 600 + D Tablet) 1 Each Tablet, 1 EACH PO 

DAILY, (Reported)


   Entered as Reported by: JOYCE CUNNINGHAM on 16 1553


Estradiol (Estradiol Tablet) 1 Mg Tablet, 1 MG PO DAILY


   Prescribed by: MATHEW GONZALEZ on 16


Levothyroxine Sodium (Levothyroxine Sodium) 100 Mcg Tablet, 100 MCG PO DAILY, 

(Reported)


   Entered as Reported by: BRITNEY FOUNTAIN on 3/16/18 1236


Lisinopril (Lisinopril) 10 Mg Tablet, 10 MG PO DAILY, (Reported)


   Entered as Reported by: HAO MONTOYA on 19 0936


Multivitamin (Multivitamins) 1 Each Capsule, 1 EACH PO DAILY, (Reported)


   Entered as Reported by: BRITNEY FOUNTAIN on 3/16/18 1236


Nebivolol HCl (Bystolic) 5 Mg Tablet, 5 MG PO DAILY, (Reported)


   Entered as Reported by: JOYCE CUNNINGHAM on 16 1553


Rosuvastatin Calcium (Crestor) 10 Mg Tablet, 10 MG PO DAILY, (Reported)


   Entered as Reported by: BRITNEY FOUNTAIN on 3/16/18 1236





Review of Systems


Review of Systems


Constitutional:  see HPI


EENTM:  No Symptoms Reported


Respiratory:  No Symptoms Reported


Cardiovascular:  See HPI; Denies Chest Pain


Gastrointestinal:  No Symptoms Reported


Genitourinary:  No Symptoms Reported


Musculoskeletal:  no symptoms reported


Skin:  no symptoms reported


Psychiatric/Neurological:  No Symptoms Reported


Endocrine:  No Symptoms Reported


Hematologic/Lymphatic:  No Symptoms Reported (REE JARAMILLO)





Past Medical-Social-Family Hx


Patient Social History


Tobacco Use?:  No


Use of E-Cig and/or Vaping dev:  No


Substance use?:  No


Alcohol Use?:  No


 (REE JARAMILLO)





Immunizations Up To Date


First/Initial COVID19 Vaccinat:  


Second COVID19 Vaccination Yogi:  


COVID19 Vaccine :  JUDY


 (REE JARAMILLO)





Seasonal Allergies


Seasonal Allergies:  No


 (REE JARAMILLO)





Past Medical History


Appendectomy,  Section, Hysterectomy, Thyroidectomy, Tubal Ligation


Respiratory:  No


Currently Using CPAP:  No


Currently Using BIPAP:  No


Cardiac:  Yes


Deep Vein Thrombosis, High Cholesterol, Hypertension, Palpitations


Neurological:  No


Reproductive Disorders:  No


GYN History:  Hysterectomy


Sexually Transmitted Disease:  No


HIV/AIDS:  No


Gastrointestinal:  No


Loss of Vision:  Bilateral


Hearing Impairment:  Denies


Cancer:  No


Blood Disorders:  Yes (hx of DVT)


Adverse Reaction/Blood Tranf:  No (N/A)


 (REE JARAMILLO)





Family Medical History





Arthritis


  19 MOTHER


Cardiovascular disease


  19 MOTHER


Colon cancer


  19 MOTHER


Colon cancer


  19 MOTHER


Diabetes mellitus


  19 MOTHER


Hypertension


  19 MOTHER


Kidney disease


  19 MOTHER


Thyroid disease


  19 FATHER


  19 MOTHER


  G8 SISTER





Physical Exam


Vital Signs





Vital Signs - First Documented








 21





 16:55


 


Pulse 66


 


Resp 18


 


B/P (MAP) 221/102 (141)


 


Pulse Ox 98


 


O2 Delivery Room Air








 (REECE BATES MD)


Vital Signs


Capillary Refill : Less Than 3 Seconds 


 (REE JARAMILLO)


Height, Weight, BMI


Height: 5'7.00"


Weight: 210lbs. 0.0oz. 95.300153on; 32.00 BMI


Method:


General Appearance:  No Apparent Distress, WD/WN


Neck:  Full Range of Motion, Normal Inspection


Respiratory:  No Accessory Muscle Use, No Respiratory Distress


Cardiovascular:  Regular Rate, Rhythm, Normal Peripheral Pulses


Gastrointestinal:  Non Tender, Soft


Extremity:  Normal Capillary Refill, Normal Inspection


Neurologic/Psychiatric:  Alert, Oriented x3


Skin:  Normal Color, Warm/Dry (REE JARAMILLO)





Progress/Results/Core Measures


Results/Orders


Lab Results





Laboratory Tests








Test


 21


17:46 Range/Units


 


 


White Blood Count


 9.0 


 4.3-11.0


10^3/uL


 


Red Blood Count


 4.06 


 3.80-5.11


10^6/uL


 


Hemoglobin 12.2  11.5-16.0  g/dL


 


Hematocrit 37  35-52  %


 


Mean Corpuscular Volume 92  80-99  fL


 


Mean Corpuscular Hemoglobin 30  25-34  pg


 


Mean Corpuscular Hemoglobin


Concent 33 


 32-36  g/dL





 


Red Cell Distribution Width 12.4  10.0-14.5  %


 


Platelet Count


 263 


 130-400


10^3/uL


 


Mean Platelet Volume 10.1  9.0-12.2  fL


 


Immature Granulocyte % (Auto) 0   %


 


Neutrophils (%) (Auto) 65  42-75  %


 


Lymphocytes (%) (Auto) 26  12-44  %


 


Monocytes (%) (Auto) 6  0-12  %


 


Eosinophils (%) (Auto) 2  0-10  %


 


Basophils (%) (Auto) 0  0-10  %


 


Neutrophils # (Auto)


 5.9 


 1.8-7.8


10^3/uL


 


Lymphocytes # (Auto)


 2.4 


 1.0-4.0


10^3/uL


 


Monocytes # (Auto)


 0.6 


 0.0-1.0


10^3/uL


 


Eosinophils # (Auto)


 0.2 


 0.0-0.3


10^3/uL


 


Basophils # (Auto)


 0.0 


 0.0-0.1


10^3/uL


 


Immature Granulocyte # (Auto)


 0.0 


 0.0-0.1


10^3/uL


 


Sodium Level 140  135-145  MMOL/L


 


Potassium Level 3.8  3.6-5.0  MMOL/L


 


Chloride Level 105    MMOL/L


 


Carbon Dioxide Level 24  21-32  MMOL/L


 


Anion Gap 11  5-14  MMOL/L


 


Blood Urea Nitrogen 14  7-18  MG/DL


 


Creatinine


 0.68 


 0.60-1.30


MG/DL


 


Estimat Glomerular Filtration


Rate 90 


  





 


BUN/Creatinine Ratio 21   


 


Glucose Level 82    MG/DL


 


Calcium Level 8.8  8.5-10.1  MG/DL


 


Corrected Calcium 8.8  8.5-10.1  MG/DL


 


Magnesium Level 2.2  1.6-2.4  MG/DL


 


Total Bilirubin 0.3  0.1-1.0  MG/DL


 


Aspartate Amino Transf


(AST/SGOT) 13 


 5-34  U/L





 


Alanine Aminotransferase


(ALT/SGPT) 14 


 0-55  U/L





 


Alkaline Phosphatase 88    U/L


 


B-Type Natriuretic Peptide 101.6 H <100.0  PG/ML


 


Total Protein 6.9  6.4-8.2  GM/DL


 


Albumin 4.0  3.2-4.5  GM/DL


 


Thyroid Stimulating Hormone


(TSH) 1.50 


 0.35-4.94


UIU/ML


 


Free Thyroxine


 1.07 


 0.70-1.48


NG/DL





 (REECE BATES MD)


Medications Given in ED





Current Medications








 Medications  Dose


 Ordered  Sig/Sudeep


 Route  Start Time


 Stop Time Status Last Admin


Dose Admin


 


 Clonidine HCl  0.1 mg  ONCE  ONCE


 PO  21 17:30


 21 17:31 DC 21 17:45


0.1 MG





 (REECE BATES MD)


Vital Signs/I&O











 21





 16:55 18:13 18:56


 


Pulse 66 57 83


 


Resp 18 18 18


 


B/P (MAP) 221/102 (141) 176/97 157/82


 


Pulse Ox 98 100 98


 


O2 Delivery Room Air Room Air Room Air





 (REECE BATES MD)








Blood Pressure Mean:                    141











Departure


Communication (Admissions)


8394-still feels okay.  Blood pressure down from 220/100--->170/90.  Labs are 

unremarkable.  She is on a low-dose of Bystolic and lisinopril, we can certainly

increase these.   states that there is been a lot of stress, both of 

their daughters are moving out of the house, time for finals, a dog is moving 

into their house and its the holidays.


 (REE JARAMILLO)





Impression





   Primary Impression:  


   Hypertension


Disposition:  01 HOME, SELF-CARE


Condition:  Stable





Departure-Patient Inst.


Decision time for Depature:  18:45


 (REE JARAMILLO)


Referrals:  


ROSE MIR MD (PCP/Family)


Primary Care Physician


Patient Instructions:  High Blood Pressure Emergencies





Add. Discharge Instructions:  


1.  Go home and take an extra lisinopril 10 mg tablet this evening.  Recheck 

your blood pressure in the morning.  Return to ER for any concerns.





All discharge instructions reviewed with patient and/or family. Voiced 

understanding.








ATTENDING PHYSICIAN NOTE:


I was physically present as attending physician in the emergency department 

during the care of this patient, but I was not directly involved in the decision

making or delivery of care for this patient. 


 (REECE BATES MD)





Copy


Copies To 1:   DARIANA NINO MD; ROSE MIR MD, PETER J APRN              Dec 7, 2021 17:32


REECE BATES MD         Dec 7, 2021 20:19

## 2022-08-11 ENCOUNTER — HOSPITAL ENCOUNTER (OUTPATIENT)
Dept: HOSPITAL 75 - RAD | Age: 57
End: 2022-08-11
Attending: OBSTETRICS & GYNECOLOGY
Payer: COMMERCIAL

## 2022-08-11 DIAGNOSIS — Z12.31: Primary | ICD-10-CM

## 2022-08-11 PROCEDURE — 77067 SCR MAMMO BI INCL CAD: CPT

## 2022-08-11 PROCEDURE — 77063 BREAST TOMOSYNTHESIS BI: CPT

## 2022-08-11 NOTE — DIAGNOSTIC IMAGING REPORT
Indication: Routine screening.



Comparison is made with prior mammograms from 8/10/2021 and

8/3/2020.



2-D and 3-D bilateral screening mammography was performed with

CAD.



Both breasts are heterogeneously dense, limiting the sensitivity

of mammography. Benign calcifications are again noted

bilaterally. No mass or malignant-appearing microcalcifications

are identified. Axillae are unremarkable.



IMPRESSION: BI-RADS Category 2



No mammographic features suspicious for malignancy are

identified.



ACR BI-RADS Category 2: Benign findings.

Result letter will be mailed to the patient.

Note: At least 10% of breast cancer is not imaged by mammography.



Dictated by: 



  Dictated on workstation # [id number]

## 2023-03-07 NOTE — HISTORY AND PHYSICAL
DATE OF SERVICE: 03/17/2023



COLONOSCOPY HISTORY AND PHYSICAL



HISTORY OF PRESENT ILLNESS:

The patient is a 58-year-old white female referred by Dr. Gao for screening 

colonoscopy.  She is deemed to be of higher than average risk as her mother was 

diagnosed with colon cancer at the age of 60.  She last accomplished colonoscopy

5 years ago, at which time, one hyperplastic polyp was removed from the distal 

rectum with no other areas of neoplasia being identified.  She reports she has 

been trending toward constipation.  She will go 6-7 days without going and then 

she will have a so-called cleaning out, roughly will go 4 or 5 times.  She has 

noted no blood associated with this.  There has been no change in weight.  She 

does not have pain until she has not had a bowel movement for 4-5 days and will 

notice some distention and discomfort.



PAST MEDICAL HISTORY:

Hyperlipidemia and hypertension with no known history of coronary disease.  She 

has a multinodular goiter and is on thyroid replacement post-thyroidectomy.



PAST SURGICAL HISTORY:

Thyroidectomy.  She has had a total abdominal hysterectomy for benign reasons in

2016 and appendectomy in 2010 and had a right knee scope several years ago.



FAMILY HISTORY:

As noted in the HPI.



SOCIAL HISTORY:

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 

drinking.



REVIEW OF SYSTEMS:

CONSTITUTIONAL:  Denies night sweats, chills, fever, change in weight.

GI: As noted in the HPI.  She has had no melena or bright red blood per rectum.

PULMONARY:  Denies cough, wheezing or shortness of breath.

CARDIOVASCULAR:  Denies chest discomfort, orthopnea, PND, pedal edema or 

syncope.



PHYSICAL EXAMINATION:

GENERAL:  Reveals a white female, appeared to be in no acute distress.

VITAL SIGNS:  Weight 207 pounds, blood pressure 132/82.

HEENT:  Unremarkable.  Sclerae nonicteric.

CHEST:  Clear to auscultation.

CARDIOVASCULAR:  Reveals a regular rate and rhythm without murmur, S3, or S4.

ABDOMEN:  Soft, supple without mass, organomegaly, or tenderness.

EXTREMITIES:  Reveal no cyanosis, clubbing or edema.



ASSESSMENT AND PLAN:

The patient is being set up for screening colonoscopy deemed to be of higher 

than average risk considering her mother was diagnosed with colon cancer at the 

age of 60.  Due to some constipation issues, advised the patient to hold her 

calcium, continue vitamin D. Discuss amlodipine, change to see if either of 

these medications are contributing.  We had discussed amlodipine change with Dr. Gao as both of these medications may be contributing to constipation p.r.n. 

MiraLax in the interim.



I thank you for the referral of this pleasant lady.  Prep instructions were 

given and questions were answered.





Job ID: [id number]

DocumentID: [id number]

Dictated Date: 03/01/2023 11:51:32

Transcription Date: 03/01/2023 12:25:00

Dictated By: AMRIT ARELLANO MD

## 2023-03-08 ENCOUNTER — HOSPITAL ENCOUNTER (OUTPATIENT)
Dept: HOSPITAL 75 - PREOP | Age: 58
LOS: 2 days | Discharge: HOME | End: 2023-03-10
Attending: INTERNAL MEDICINE
Payer: COMMERCIAL

## 2023-03-08 VITALS — WEIGHT: 199.96 LBS | HEIGHT: 67.01 IN | BODY MASS INDEX: 31.38 KG/M2

## 2023-03-08 DIAGNOSIS — Z01.818: Primary | ICD-10-CM

## 2023-03-17 ENCOUNTER — HOSPITAL ENCOUNTER (OUTPATIENT)
Dept: HOSPITAL 75 - ENDO | Age: 58
Discharge: HOME | End: 2023-03-17
Attending: INTERNAL MEDICINE
Payer: COMMERCIAL

## 2023-03-17 VITALS — DIASTOLIC BLOOD PRESSURE: 62 MMHG | SYSTOLIC BLOOD PRESSURE: 117 MMHG

## 2023-03-17 VITALS — DIASTOLIC BLOOD PRESSURE: 74 MMHG | SYSTOLIC BLOOD PRESSURE: 126 MMHG

## 2023-03-17 VITALS — SYSTOLIC BLOOD PRESSURE: 140 MMHG | DIASTOLIC BLOOD PRESSURE: 76 MMHG

## 2023-03-17 VITALS — BODY MASS INDEX: 31.38 KG/M2 | HEIGHT: 66.93 IN | WEIGHT: 199.96 LBS

## 2023-03-17 VITALS — SYSTOLIC BLOOD PRESSURE: 117 MMHG | DIASTOLIC BLOOD PRESSURE: 66 MMHG

## 2023-03-17 DIAGNOSIS — K63.5: ICD-10-CM

## 2023-03-17 DIAGNOSIS — Z12.11: Primary | ICD-10-CM

## 2023-03-17 DIAGNOSIS — Z80.0: ICD-10-CM

## 2023-03-17 PROCEDURE — 88305 TISSUE EXAM BY PATHOLOGIST: CPT

## 2023-03-17 NOTE — PROGRESS NOTE-POST OPERATIVE
Post-Procedure Note


Physician (s)/Assistant (s)


Physician


AMRIT ARELLANO MD





Pre-Procedure Diagnosis


Pre-Procedure Diagnosis:  screening





Post-Procedure Diagnosis


Post-operative diagnosis:  


Prior to undergoing colonoscopy digital rectal evaluation was performed.


Anal sphincter tone was normal and the perianal reflexes intact.  No


abnormalities noted   On digital inspection anal canal or distal rectal


vault. the colonoscope was inserted into the rectum and under direct


visualization advanced to the cecum.  The cecum was identified by


indication of the ileocecal valve cecal strap and appendiceal orifice.


Photographic documentation was obtained.  A careful inspection was made as


the colonoscope was withdrawn.  Quality the prep was good.





Findings:


There are no evidence for internal/external hemorrhoids in the rectum


sigmoid colon descending colon splenic flexure and transverse colon were


were normal.  Present at the hepatic flexure was a 3 mm sessile polyp it


was biopsied and ablated.  There was more than typical amount of blood


loss estimated 5 to 10 cc still oozing so Endo Clip was deployed with


immediate  cessation of bleeding.  The colonoscope was advanced to the


cecum with normal ascending colon and cecum.  On withdrawal under good


prep conditions visualization of the deployed Endo Clip on the way out


revealed no further bleeding.  Quality of the prep was good.





Assessment:


1 3 mm sessile polyp was removed via hot forceps from the hepatic flexure


with an otherwise normal colonoscopy to the cecum.  There was more than


the average amount of bleeding so an Endo Clip was deployed with cessation


of bleeding.  Patient was advised to avoid aspirin nonsteroidal medication


for the next week.  Considering family history would advocate


consideration for repeat screening colonoscopy in 5 years.  I thank you


for the furl of this pleasant lady sincerely, Amrit Arellano MD.





CC: MD EILEEN Glover MARK D MD              Mar 17, 2023 09:40

## 2023-03-17 NOTE — ANESTHESIA-GENERAL POST-OP
MAC


Patient Condition


Mental Status/LOC:  Same as Preop


Cardiovascular:  Satisfactory


Nausea/Vomiting:  Absent


Respiratory:  Satisfactory


Pain:  Controlled


Complications:  Absent





Post Op Complications


Complications


None





Follow Up Care/Instructions


Patient Instructions


None needed.





Anesthesiology Discharge Order


Discharge Order


Patient is doing well, no complaints, stable vital signs, no apparent adverse 

anesthesia problems.   


No complications reported per nursing.











UMU DOWNEY CRNA              Mar 17, 2023 10:32

## 2023-03-17 NOTE — PRE-OP NOTE & CONSCIOUS SEDAT
Pre-Operative Progress Note


Date H&P Reviewed:  Mar 17, 2023


Time H&P Reviewed:  08:04


History & Physical:  H&P Reviewed, Patient Examed, No changes noted


Pre-Op Diagnosis:  screening





Moderate Sedation PreProcedure


ASA Score


2














Airway 


 


Lungs 


 


Heart 


 


 ASA score


 


 ASA 1: a normal healthy patient


 


 ASA 2:  a patient with a mild systemic disease (mid diabetes, controlled 

hypertension, obesity 


 


 ASA 3:  a patient with a severe systemic disease that limits activity  (angina,

COPD, prior Myocardial infarction)


 


 ASA 4:  a patient with an incapacitating disease that is a constant threat to 

life (CHF, renal failure)


 


 ASA 5:  a moribund patient not expected to survive 24 hrs.  (ruptured aneurysm)


 


 ASA 6:  a declared brain-dead patient whose organs are being harvested.


 


 For emergent operations, add the letter E after the classification











Mallampati Classification


Grade 2





Sedation Plan


Analgesia, Amnesia, Plan communicated to team members, Discussed options with 

patient/fam, Discussed risks with patient/fam


The patient is an appropriate candidate to undergo the planned procedure, 

sedation, and anesthesia.





The patient immediately re-assessed prior to indication.











AMRIT ARELLANO MD              Mar 17, 2023 08:04

## 2023-08-29 ENCOUNTER — HOSPITAL ENCOUNTER (OUTPATIENT)
Dept: HOSPITAL 75 - RAD | Age: 58
End: 2023-08-29
Attending: OBSTETRICS & GYNECOLOGY
Payer: COMMERCIAL

## 2023-08-29 DIAGNOSIS — Z12.31: Primary | ICD-10-CM

## 2023-08-29 PROCEDURE — 77063 BREAST TOMOSYNTHESIS BI: CPT

## 2023-08-29 PROCEDURE — 77067 SCR MAMMO BI INCL CAD: CPT

## 2023-08-29 NOTE — DIAGNOSTIC IMAGING REPORT
INDICATION: 

Routine screening.



COMPARISON: 

08/11/2022 and 08/10/2021.



TECHNIQUE: 

2D and 3D bilateral screening mammography was performed with CAD.



FINDINGS:

Both breasts are heterogeneously dense, limiting the sensitivity

of mammography. Benign calcifications in the right breast are

noted. The overall parenchymal pattern is stable. No dominant

mass or malignant-appearing microcalcifications are seen. The

axillae are unremarkable.



IMPRESSION: 

No mammographic features suspicious for malignancy are

identified.



ACR BI-RADS Category 2: Benign findings.

Result letter will be mailed to the patient.

Note: At least 10% of breast cancer is not imaged by mammography.



Dictated by: 



  Dictated on workstation # [id number]

## 2023-09-12 NOTE — DIAGNOSTIC IMAGING REPORT
EXAMINATION: Three views of the lumbar spine.



INDICATION: Back pain.



FINDINGS: There is satisfactory alignment of the posterior spinal

line. There is preserved vertebral body height. Mild disc height

loss at L4-L5 and L5-S1 is seen. No significant posterior

osteophytes noted. There are mild anterior osteophytes in the

upper-to-mid lumbar spine. Minimal sclerotic degenerative changes

in the right SI joint are seen.



IMPRESSION: Mild lower lumbar spine disc degenerative changes.



Dictated by: 



  Dictated on workstation # [id number] Colchicine Counseling:  Patient counseled regarding adverse effects including but not limited to stomach upset (nausea, vomiting, stomach pain, or diarrhea).  Patient instructed to limit alcohol consumption while taking this medication.  Colchicine may reduce blood counts especially with prolonged use.  The patient understands that monitoring of kidney function and blood counts may be required, especially at baseline. The patient verbalized understanding of the proper use and possible adverse effects of colchicine.  All of the patient's questions and concerns were addressed.